# Patient Record
Sex: FEMALE | Race: WHITE | NOT HISPANIC OR LATINO | Employment: PART TIME | URBAN - METROPOLITAN AREA
[De-identification: names, ages, dates, MRNs, and addresses within clinical notes are randomized per-mention and may not be internally consistent; named-entity substitution may affect disease eponyms.]

---

## 2018-01-18 NOTE — PROGRESS NOTES
Assessment    1  Never a smoker   2  Migraine headache (346 90) (G43 909)   3  Need for lipid screening (V77 91) (Z13 220)   4  Encounter for vitamin deficiency screening (V77 99) (Z13 21)   5  Encounter for preventive health examination (V70 0) (Z00 00)    Plan  Encounter for vitamin deficiency screening, Migraine headache, Need for lipid screening    · (1) VITAMIN D 25-HYDROXY; Status:Active - Retrospective Authorization; Requested  for:[id number];   FamHx: Heart Disease, FamHx: Hypertension, Migraine headache, Need for lipid  screening    · (1) LIPID PANEL, FASTING; Status:Canceled - Retrospective Authorization;   Migraine headache    · (1) CBC/PLT/DIFF; Status:Active - Retrospective By Protocol Authorization; Requested  for:[id number];    · (1) CBC/PLT/DIFF; Status:Canceled - Retrospective Authorization; Need for lipid screening    · (1) LIPID PANEL, FASTING; Status:Active - Retrospective By Protocol Authorization; Requested for:[id number];   Screening for tuberculosis    · Tubersol 5 UNIT/0 1ML Intradermal Solution     Check PPD  Fill out form  Discussion/Summary  Currently, she eats an adequate diet and has an adequate exercise regimen  Advice and education were given regarding nutrition, calcium supplements and vitamin D supplements  Healthy  History of Present Illness  HM, Adult Female: The patient is being seen for a health maintenance evaluation  General Health: The patient's health since the last visit is described as good  Lifestyle:  She consumes a diverse and healthy diet  (Eats fruits, vegetables  Dietary calcium not much)   She exercises regularly  (Goes to the gym at school regularly  )   She does not use tobacco  She consumes alcohol  caffeine not much  Reproductive health:  she is sexually active  Screening: Additional History:   Gyn checkup  HPI: Hal Nelsy says her headaches have been better  Thinks was related to her high school environment   Relieved by Excedrin Migraine and sleep  Active Problems    1  Breakthrough bleeding (626 6) (N92 1)   2  Classic migraine with aura (346 00) (G43 109)   3  Migraine headache (346 90) (G43 909)   4  Tension type headache (339 10) (G44 209)    Past Medical History    · History of headache (V13 89) (Z87 898)    Surgical History    · History of Oral Surgery   · History of Oral Surgery Tooth Extraction    Family History  Mother    · Family history of Hypertension (V17 49)  Grandmother    · Family history of arthritis (V17 7) (Z82 61)   · Family history of diabetes mellitus (V18 0) (Z83 3)  Maternal Grandmother    · Family history of Cancer   · Family history of Hypertension (V17 49)  Paternal Grandmother    · Family history of Diabetes Mellitus (V18 0)   · Family history of Heart Disease (V17 49)   · Family history of Hypertension (V17 49)  Grandfather    · Family history of arthritis (V17 7) (Z82 61)   · Family history of diabetes mellitus (V18 0) (Z83 3)  Maternal Grandfather    · Family history of Cancer   · Family history of Diabetes Mellitus (V18 0)   · Family history of Heart Disease (V17 49)   · Family history of Hypertension (V17 49)  Family History    · Family history of Family Health Status Siblings    Social History    · Denied: History of Alcohol Use (History)   · Daily Tea Consumption (2  Cups/Day)   · Never a smoker   · Never Used Drugs    Current Meds   1  Minastrin 24 Fe 1-20 MG-MCG(24) Oral Tablet Chewable Recorded    Allergies    1  No Known Drug Allergies    Vitals   Recorded: 16Yro3131 11:47AM   Heart Rate 68   Respiration 20   Systolic 487   Diastolic 76   Height 4 ft 10 in   Weight 111 lb 2 08 oz   BMI Calculated 23 23   BSA Calculated 1 41     Physical Exam    Constitutional   General appearance: No acute distress, well appearing and well nourished  Head and Face   Head and face: Normal     Eyes   Conjunctiva and lids: No swelling, erythema or discharge  Pupils and irises: Equal, round, reactive to light      Ears, Nose, Mouth, and Throat   Otoscopic examination: Tympanic membranes translucent with normal light reflex  Canals patent without erythema  Lips, teeth, and gums: Normal, good dentition  Oropharynx: Normal with no erythema, edema, exudate or lesions  Neck   Neck: Supple, symmetric, trachea midline, no masses  Thyroid: Normal, no thyromegaly  Pulmonary   Respiratory effort: No increased work of breathing or signs of respiratory distress  Auscultation of lungs: Clear to auscultation  Cardiovascular   Auscultation of heart: Normal rate and rhythm, normal S1 and S2, no murmurs  Abdominal aorta: Normal     Femoral pulses: 2+ bilaterally  Pedal pulses: 2+ bilaterally  Peripheral vascular exam: Normal     Examination of extremities for edema and/or varicosities: Normal     Abdomen   Abdomen: Non-tender, no masses  Liver and spleen: No hepatomegaly or splenomegaly  Lymphatic   Palpation of lymph nodes in neck: No lymphadenopathy  Palpation of lymph nodes in groin: No lymphadenopathy  Skin   Skin and subcutaneous tissue: Normal without rashes or lesions      Psychiatric   Mood and affect: Normal        Future Appointments    Date/Time Provider Specialty Site   07/15/2016 01:30 PM Josefina Chandler Nurse Schedule  [address]     Signatures   Electronically signed by : WALTER Enriquez ; Jul 13 2016  1:01PM EST                       (Author)

## 2018-05-15 ENCOUNTER — TELEPHONE (OUTPATIENT)
Dept: FAMILY MEDICINE CLINIC | Facility: MEDICAL CENTER | Age: 23
End: 2018-05-15

## 2018-05-15 DIAGNOSIS — Z86.69 HX OF MIGRAINES: ICD-10-CM

## 2018-05-15 DIAGNOSIS — E55.9 VITAMIN D DEFICIENCY: Primary | ICD-10-CM

## 2018-05-15 DIAGNOSIS — Z13.220 NEED FOR LIPID SCREENING: ICD-10-CM

## 2018-05-16 NOTE — TELEPHONE ENCOUNTER
Does not look like she never got the blood work ordered back in 2016  If not go ahead and give her the same orders

## 2018-06-13 ENCOUNTER — TELEPHONE (OUTPATIENT)
Dept: FAMILY MEDICINE CLINIC | Facility: MEDICAL CENTER | Age: 23
End: 2018-06-13

## 2018-06-13 ENCOUNTER — OFFICE VISIT (OUTPATIENT)
Dept: FAMILY MEDICINE CLINIC | Facility: MEDICAL CENTER | Age: 23
End: 2018-06-13
Payer: COMMERCIAL

## 2018-06-13 VITALS
BODY MASS INDEX: 21.03 KG/M2 | SYSTOLIC BLOOD PRESSURE: 130 MMHG | RESPIRATION RATE: 16 BRPM | HEART RATE: 100 BPM | WEIGHT: 100.6 LBS | DIASTOLIC BLOOD PRESSURE: 80 MMHG

## 2018-06-13 DIAGNOSIS — R73.9 ELEVATED BLOOD SUGAR LEVEL: Primary | ICD-10-CM

## 2018-06-13 DIAGNOSIS — E10.9 TYPE 1 DIABETES MELLITUS WITHOUT COMPLICATION (HCC): ICD-10-CM

## 2018-06-13 LAB — SL AMB POCT HEMOGLOBIN AIC: 13

## 2018-06-13 PROCEDURE — 99213 OFFICE O/P EST LOW 20 MIN: CPT | Performed by: FAMILY MEDICINE

## 2018-06-13 PROCEDURE — 83036 HEMOGLOBIN GLYCOSYLATED A1C: CPT | Performed by: FAMILY MEDICINE

## 2018-06-13 RX ORDER — NORETHINDRONE ACETATE AND ETHINYL ESTRADIOL AND FERROUS FUMARATE 1MG-20(24)
KIT ORAL
Refills: 4 | COMMUNITY
Start: 2018-05-17 | End: 2018-08-15 | Stop reason: SDUPTHER

## 2018-06-13 NOTE — TELEPHONE ENCOUNTER
I was able to speak with patient  Sugars have been high and she has only had a 10 lb weight loss  She otherwise feels fine  Please schedule her for tomorrow Friday  Please send this message forward to clinical so they may contact patient's mother  Please get permission from patient to speak with her mother

## 2018-06-13 NOTE — TELEPHONE ENCOUNTER
I spoke with Dorcas Woodard and she gave our office verbal approval to speak with her mother  Will have pt complete a communication consent form at her appt 6/14/18

## 2018-06-13 NOTE — TELEPHONE ENCOUNTER
Attempted to call the home phone and I did speak with patient's mother  Unfortunately there is no release to discuss medical information in the chart and I did not provide mom with any information  I did call patient on her cell phone but she did not answer  I did leave a message to call the office back  I did review the chart  Based on her sugars and her weight loss I am concerned that she has the start of diabetic ketoacidosis  I do recommend she be evaluated in the ER where they can get testing in a more timely manner  If needed they can also admit patient to get her sugars controlled and get an endocrinology consult

## 2018-06-13 NOTE — PROGRESS NOTES
Assessment/Plan:    No problem-specific Assessment & Plan notes found for this encounter  Diagnoses and all orders for this visit:    Elevated blood sugar level  -     POCT hemoglobin A1c    Type 1 diabetes mellitus without complication (HCC)  -     Ambulatory referral to Endocrinology; Future    Other orders  -     MELODETTA 24 FE 1-20 MG-MCG(24) CHEW; CHEW 1 TABLET BY ORAL ROUTE EVERY DAY      An A1c was performed in the office today and patient's A1c was 13%  By all accounts based on her age, body habitus, elevated glucose and elevated A1c she is a type 1 diabetic  I discussed with patient that she will need to see Endocrinology and she did agree  We contacted [facility] while patient was in the office and they were kind enough to have patient scheduled for tomorrow morning  A referral was placed  Follow-up with PCP on 6/25/2018 or sooner if needed  Subjective:      Patient ID: Joel Armendariz is a 25 y o  female  Patient presents for follow-up  She had labs done recently for primary care and looked on the lab web site and noticed that her sugars were high  She has had a 10 lb weight loss  She is not sure when this happened  She went on the scale one day in March and noticed that she lost 10 lb  Friends and family have commented about patient's weight loss  Patient otherwise feels fine  She has no complaints  The following portions of the patient's history were reviewed and updated as appropriate: allergies, current medications and problem list     Review of Systems   Constitutional: Negative for fever  Respiratory: Negative for shortness of breath  Cardiovascular: Negative for chest pain  Objective:      /80 (Cuff Size: Standard)   Pulse 100   Resp 16   Wt 45 6 kg (100 lb 9 6 oz)   BMI 21 03 kg/m²          Physical Exam   Constitutional: She appears well-developed and well-nourished     Cardiovascular: Normal rate, regular rhythm and normal heart sounds      Pulmonary/Chest: Effort normal and breath sounds normal

## 2018-06-13 NOTE — TELEPHONE ENCOUNTER
Mom called, said pt glucose was 254  Pt has appt with Dr Hermelindo Argueta 6/28/18  Mom is concerned wants to know if she should have further testing, be seen sooner, Please call her

## 2018-06-14 ENCOUNTER — OFFICE VISIT (OUTPATIENT)
Dept: ENDOCRINOLOGY | Facility: CLINIC | Age: 23
End: 2018-06-14
Payer: COMMERCIAL

## 2018-06-14 ENCOUNTER — OFFICE VISIT (OUTPATIENT)
Dept: DIABETES SERVICES | Facility: CLINIC | Age: 23
End: 2018-06-14
Payer: COMMERCIAL

## 2018-06-14 VITALS
HEART RATE: 98 BPM | WEIGHT: 100.4 LBS | DIASTOLIC BLOOD PRESSURE: 70 MMHG | BODY MASS INDEX: 21.07 KG/M2 | HEIGHT: 58 IN | SYSTOLIC BLOOD PRESSURE: 130 MMHG

## 2018-06-14 DIAGNOSIS — E10.9 TYPE 1 DIABETES MELLITUS WITHOUT COMPLICATION (HCC): Primary | ICD-10-CM

## 2018-06-14 DIAGNOSIS — E10.9 TYPE 1 DIABETES MELLITUS WITHOUT COMPLICATION (HCC): ICD-10-CM

## 2018-06-14 LAB
T4 FREE SERPL-MCNC: 1.13 NG/DL (ref 0.76–1.46)
TSH SERPL DL<=0.05 MIU/L-ACNC: 0.74 UIU/ML (ref 0.36–3.74)

## 2018-06-14 PROCEDURE — 99205 OFFICE O/P NEW HI 60 MIN: CPT | Performed by: INTERNAL MEDICINE

## 2018-06-14 PROCEDURE — 86341 ISLET CELL ANTIBODY: CPT | Performed by: INTERNAL MEDICINE

## 2018-06-14 PROCEDURE — 86255 FLUORESCENT ANTIBODY SCREEN: CPT | Performed by: INTERNAL MEDICINE

## 2018-06-14 PROCEDURE — 84439 ASSAY OF FREE THYROXINE: CPT | Performed by: INTERNAL MEDICINE

## 2018-06-14 PROCEDURE — 82784 ASSAY IGA/IGD/IGG/IGM EACH: CPT | Performed by: INTERNAL MEDICINE

## 2018-06-14 PROCEDURE — 86337 INSULIN ANTIBODIES: CPT | Performed by: INTERNAL MEDICINE

## 2018-06-14 PROCEDURE — 84443 ASSAY THYROID STIM HORMONE: CPT | Performed by: INTERNAL MEDICINE

## 2018-06-14 PROCEDURE — 83516 IMMUNOASSAY NONANTIBODY: CPT | Performed by: INTERNAL MEDICINE

## 2018-06-14 PROCEDURE — 83519 RIA NONANTIBODY: CPT | Performed by: INTERNAL MEDICINE

## 2018-06-14 PROCEDURE — G0108 DIAB MANAGE TRN  PER INDIV: HCPCS | Performed by: DIETITIAN, REGISTERED

## 2018-06-14 PROCEDURE — 36415 COLL VENOUS BLD VENIPUNCTURE: CPT | Performed by: INTERNAL MEDICINE

## 2018-06-14 RX ORDER — BLOOD-GLUCOSE METER
EACH MISCELLANEOUS 4 TIMES DAILY
Qty: 1 KIT | Refills: 0 | Status: SHIPPED | OUTPATIENT
Start: 2018-06-14 | End: 2018-12-21

## 2018-06-14 RX ORDER — LANCETS
EACH MISCELLANEOUS 4 TIMES DAILY
Qty: 120 EACH | Refills: 3 | Status: SHIPPED | OUTPATIENT
Start: 2018-06-14 | End: 2018-06-14 | Stop reason: CLARIF

## 2018-06-14 NOTE — TELEPHONE ENCOUNTER
Was advised by [facility] that Accu Chek guide and Beatriz Reasons  are not covered  Called [facility] [phone] was advised that lantus and Onetouch are covered  Submitted new prescriptions

## 2018-06-14 NOTE — Clinical Note
Education completed - for your review  BG in office was 217  Instructed her to send in BG log to Dr Del Cid Doctor in one week

## 2018-06-14 NOTE — PROGRESS NOTES
Gen Elise 25 y o  female MRN: [id number]    Encounter: [id number]      Assessment/Plan     Assessment: This is a 25y o -year-old female with new onset type 1 diabetes  Plan:  1  New onset type 1 diabetes that is poorly controlled based on hemoglobin A1c of 13%  I referred her to diabetes self-management training and medical nutrition therapy  She is to start [place] 20 units at bedtime and Fiasp 6 units with meals  Today, I ordered antibody testing, TSH, free T4 and celiac panel  I spent over 50 min and over 50% of it was spent counseling on pathophysiology of diabetes, nutrition, insulin and the goal standard treatment which is insulin pump and a glucose sensor  CC: Diabetes    History of Present Illness     HPI:  28-year-old woman presents for evaluation of type 1 diabetes  She states that she started losing weight about four months ago  In that time frame, she has lost about 10 lb  She denies any polyuria or polydipsia  She has not had any yeast infection but does occasionally have blurry vision  Her 1st cousin has type 1 diabetes  There is family history of type 2 diabetes  She had hemoglobin A1c done yesterday that was greater than 13%  Review of Systems   Constitutional: Negative for chills and fever  Eyes: Positive for visual disturbance  Respiratory: Negative for shortness of breath  Cardiovascular: Negative for chest pain  Gastrointestinal: Negative for constipation, diarrhea, nausea and vomiting  Endocrine: Negative for polydipsia and polyuria  Neurological: Negative for numbness  All other systems reviewed and are negative  Historical Information   No past medical history on file    Past Surgical History:   Procedure Laterality Date    MOUTH SURGERY  2012    TOOTH EXTRACTION       Social History   History   Alcohol Use No     History   Drug Use No     History   Smoking Status    Never Smoker   Smokeless Tobacco    Not on file     Family History: Family History   Problem Relation Age of Onset    Hypertension Mother     Cancer Maternal Grandmother     Hypertension Maternal Grandmother     Cancer Maternal Grandfather     Diabetes Maternal Grandfather     Heart disease Maternal Grandfather     Hypertension Maternal Grandfather     Diabetes Paternal Grandmother     Heart disease Paternal Grandmother     Hypertension Paternal Grandmother     Arthritis Other     Diabetes Other     Arthritis Other     Diabetes Other        Meds/Allergies   Current Outpatient Prescriptions   Medication Sig Dispense Refill    MELODETTA 24 FE 1-20 MG-MCG(24) CHEW CHEW 1 TABLET BY ORAL ROUTE EVERY DAY  4     No current facility-administered medications for this visit  No Known Allergies    Objective   Vitals: Blood pressure 130/70, pulse 98, height 4' 10" (1 473 m), weight 45 5 kg (100 lb 6 4 oz)  Physical Exam   Constitutional: She is oriented to person, place, and time  She appears well-developed and well-nourished  No distress  HENT:   Head: Normocephalic and atraumatic  Mouth/Throat: Oropharynx is clear and moist and mucous membranes are normal  No oropharyngeal exudate  Eyes: Conjunctivae, EOM and lids are normal  Right eye exhibits no discharge  Left eye exhibits no discharge  No scleral icterus  Neck: Neck supple  No thyromegaly present  Cardiovascular: Normal rate, regular rhythm and normal heart sounds  Exam reveals no gallop and no friction rub  Pulses are no weak pulses  No murmur heard  Pulses:       Dorsalis pedis pulses are 1+ on the right side, and 1+ on the left side  Pulmonary/Chest: Effort normal and breath sounds normal  No respiratory distress  She has no wheezes  Abdominal: Soft  Bowel sounds are normal  She exhibits no distension  There is no tenderness  Musculoskeletal: Normal range of motion  She exhibits no edema, tenderness or deformity     Feet:   Right Foot:   Skin Integrity: Negative for ulcer, skin breakdown, erythema, warmth, callus or dry skin  Lymphadenopathy:        Head (right side): No occipital adenopathy present  Head (left side): No occipital adenopathy present  Right: No supraclavicular adenopathy present  Left: No supraclavicular adenopathy present  Neurological: She is alert and oriented to person, place, and time  No cranial nerve deficit  Skin: Skin is warm and intact  No rash noted  She is not diaphoretic  No erythema  Psychiatric: She has a normal mood and affect  Her behavior is normal    Vitals reviewed  Patient's shoes and socks removed  Right Foot/Ankle   Right Foot Inspection  Skin Exam: skin normal and skin intact no dry skin, no warmth, no callus, no erythema, no maceration, no abnormal color, no pre-ulcer, no ulcer and no callus                            Sensory   Vibration: intact    Monofilament testing: intact  Vascular    The right DP pulse is 1+  Left Foot/Ankle  Left Foot Inspection                                           Sensory   Vibration: intact    Monofilament: intact  Vascular    The left DP pulse is 1+  Assign Risk Category:  No deformity present; No loss of protective sensation; No weak pulses       Risk: 0    The history was obtained from the review of the chart, patient and family  Lab Results:   Lab Results   Component Value Date/Time     HGB A1C 13 0 06/13/2018 03:43 PM       Imaging Studies: I have personally reviewed pertinent reports  Portions of the record may have been created with voice recognition software  Occasional wrong word or "sound a like" substitutions may have occurred due to the inherent limitations of voice recognition software  Read the chart carefully and recognize, using context, where substitutions have occurred

## 2018-06-14 NOTE — LETTER
June 14, 2018     Darryle Morales, DO  [address]  [facility] [address]    Patient: Celia Kitchen   YOB: 1995   Date of Visit: 6/14/2018       Dear Dr Bonny Bautista: Thank you for referring Crista Still to me for evaluation  Below are my notes for this consultation  If you have questions, please do not hesitate to call me  I look forward to following your patient along with you  Sincerely,        Lola Dan MD        CC: No Recipients  Lola Dan MD  6/14/2018  1:08 PM  Sign at close encounter   Celia Kitchen 25 y o  female MRN: [id number]    Encounter: [id number]      Assessment/Plan     Assessment: This is a 25y o -year-old female with new onset type 1 diabetes  Plan:  1  New onset type 1 diabetes that is poorly controlled based on hemoglobin A1c of 13%  I referred her to diabetes self-management training and medical nutrition therapy  She is to start [place] 20 units at bedtime and Fiasp 6 units with meals  Today, I ordered antibody testing, TSH, free T4 and celiac panel  I spent over 50 min and over 50% of it was spent counseling on pathophysiology of diabetes, nutrition, insulin and the goal standard treatment which is insulin pump and a glucose sensor  CC: Diabetes    History of Present Illness     HPI:  80-year-old woman presents for evaluation of type 1 diabetes  She states that she started losing weight about four months ago  In that time frame, she has lost about 10 lb  She denies any polyuria or polydipsia  She has not had any yeast infection but does occasionally have blurry vision  Her 1st cousin has type 1 diabetes  There is family history of type 2 diabetes  She had hemoglobin A1c done yesterday that was greater than 13%  Review of Systems    Historical Information   No past medical history on file    Past Surgical History:   Procedure Laterality Date    MOUTH SURGERY  2012    TOOTH EXTRACTION       Social History   History Alcohol Use No     History   Drug Use No     History   Smoking Status    Never Smoker   Smokeless Tobacco    Not on file     Family History:   Family History   Problem Relation Age of Onset    Hypertension Mother     Cancer Maternal Grandmother     Hypertension Maternal Grandmother     Cancer Maternal Grandfather     Diabetes Maternal Grandfather     Heart disease Maternal Grandfather     Hypertension Maternal Grandfather     Diabetes Paternal Grandmother     Heart disease Paternal Grandmother     Hypertension Paternal Grandmother     Arthritis Other     Diabetes Other     Arthritis Other     Diabetes Other        Meds/Allergies   Current Outpatient Prescriptions   Medication Sig Dispense Refill    MELODETTA 24 FE 1-20 MG-MCG(24) CHEW CHEW 1 TABLET BY ORAL ROUTE EVERY DAY  4     No current facility-administered medications for this visit  No Known Allergies    Objective   Vitals: Blood pressure 130/70, pulse 98, height 4' 10" (1 473 m), weight 45 5 kg (100 lb 6 4 oz)  Physical Exam    The history was obtained from the review of the chart, patient and family  Lab Results:   Lab Results   Component Value Date/Time     HGB A1C 13 0 06/13/2018 03:43 PM       Imaging Studies: I have personally reviewed pertinent reports  Portions of the record may have been created with voice recognition software  Occasional wrong word or "sound a like" substitutions may have occurred due to the inherent limitations of voice recognition software  Read the chart carefully and recognize, using context, where substitutions have occurred

## 2018-06-14 NOTE — PATIENT INSTRUCTIONS
1  Follow instructions for insulin: 6 units Fiasp with meals and 20 units Tresiba at bedtime  2  If insurance requires different insulins, call office with details  3  Report BG readings to Dr Debra Orellana in one week or call sooner if BG persists over 250 or symptoms of hypoglycemia without explanation

## 2018-06-14 NOTE — PROGRESS NOTES
Type 1 diabetes and Insulin Instruction  Met with Kelley Smithg and her parents for diabetes basics and initial insulin start education  Sindi James is currently taking the following diabetes medications: None, newly diagnosed type 1  Prescribed Insulin: Theadore Vona U100 20 units at bedtime, Fiasp 6 units at meals    Patient instructed on: Basic pathophysiology of type 1 diabetes, insulin types; timing of insulin; site selection and rotation; proper technique of injection and insulin administration, safe needle disposal; medication storage; side effects and precautions of insulin  Comments: Sindi James has good understanding of insulin usage and demonstrated use of injection technique in the office  She dosed her Theadore Vona in the office, 20 units at 2:50 pm  She will give her next dose tomorrow evening and every evening thereafter  She will start using Fiasp this afternoon when they go out for a meal for lunch  Patient instruction completed on Hypoglycemia: definition/risk, causes/symptoms, treatment, prevention of hypoglycemia, when to notify physician and EMS  Comments: Sindi James has good understanding of hypoglycemia and it's treatment  Patient instruction completed on Hyperglycemia: definition, causes/symptoms, treatment, prevention of hypoglycemia, when to notify physician and EMS  Comments: Sindi James has good understanding of hyperglycemia and treatment  Diabetes Education Record: Sindi James was given the following education material: Fast 15 List, Hypoglycemia Fact Sheet, Hyperglycemia Fact Sheet, NovoNordisk brochures for [facility]  Monitoring Blood Sugars    Instructions for Meter Teaching- Patient instructed in the following:  Site selection and skin preparation, Loading strips and lancet device, meter activation, control solution, obtaining blood sample, test strip and lancet disposal and recording log book entries   Patient has good understanding of material covered and was able to test their own blood sugar in office today  Comments: Instructed on AccuChek Guide meter, Patient demonstrated use, blood sugar in office 217 mg/dl  at  2:40pm  She last ate at 9am     Testing frequency:  Test sugars before meals and at bedtime  Send in BG log to Dr Jason Enamorado in one week  Goal Blood Sugars:   Premeal , even better <110  2hr after a meal <180, even better <140  A1C <7%, even better <6 5%  Patient response to instruction  Comprehension: very good  Motivation: very good  Expected Compliance: excellent  Readiness: action  Barriers to Learning: none known     Begin Time: 1:20  End Time: 3:20  Referring Provider: Jason Enamorado    Thank you for referring your patient to [facility], it was a pleasure working with them today  Please feel free to call with any questions or concerns      Mitra Clancy  [address] [facility],  [address] [facility] [address]

## 2018-06-15 DIAGNOSIS — E10.9 TYPE 1 DIABETES MELLITUS WITHOUT COMPLICATION (HCC): Primary | ICD-10-CM

## 2018-06-15 RX ORDER — INSULIN LISPRO 100 [IU]/ML
INJECTION, SOLUTION INTRAVENOUS; SUBCUTANEOUS
Qty: 5 PEN | Refills: 3 | Status: SHIPPED | OUTPATIENT
Start: 2018-06-15 | End: 2018-06-20 | Stop reason: SDUPTHER

## 2018-06-16 LAB
ENDOMYSIUM IGA SER QL: NEGATIVE
GLIADIN PEPTIDE IGA SER-ACNC: 5 UNITS (ref 0–19)
GLIADIN PEPTIDE IGG SER-ACNC: 3 UNITS (ref 0–19)
IGA SERPL-MCNC: 437 MG/DL (ref 87–352)
TTG IGA SER-ACNC: <2 U/ML (ref 0–3)
TTG IGG SER-ACNC: 4 U/ML (ref 0–5)

## 2018-06-18 LAB — GAD65 AB SER-ACNC: 24.2 U/ML (ref 0–5)

## 2018-06-19 LAB — ISLET CELL512 AB SER-ACNC: 24 U/ML

## 2018-06-20 ENCOUNTER — OFFICE VISIT (OUTPATIENT)
Dept: DIABETES SERVICES | Facility: CLINIC | Age: 23
End: 2018-06-20
Payer: COMMERCIAL

## 2018-06-20 ENCOUNTER — TELEPHONE (OUTPATIENT)
Dept: DIABETES SERVICES | Facility: CLINIC | Age: 23
End: 2018-06-20

## 2018-06-20 ENCOUNTER — TELEPHONE (OUTPATIENT)
Dept: ENDOCRINOLOGY | Facility: CLINIC | Age: 23
End: 2018-06-20

## 2018-06-20 DIAGNOSIS — E10.9 TYPE 1 DIABETES MELLITUS WITHOUT COMPLICATION (HCC): Primary | ICD-10-CM

## 2018-06-20 PROCEDURE — G0108 DIAB MANAGE TRN  PER INDIV: HCPCS | Performed by: DIETITIAN, REGISTERED

## 2018-06-20 RX ORDER — INSULIN LISPRO 100 [IU]/ML
INJECTION, SOLUTION INTRAVENOUS; SUBCUTANEOUS
Qty: 5 PEN | Refills: 0
Start: 2018-06-20 | End: 2018-09-13

## 2018-06-20 NOTE — PROGRESS NOTES
Carbohydrate Counting Instruction    Met with Maty Jung for carbohydrate counting  Maty Jung is currently on the following insulin regimen:  Fiasp 6 units with meals, Tresiba 20 units at bedtime    Patient Instructed on: Carbohydrate counting  Present at visit: Maty Jung and her mom  Maty Jung brought in her BG records and a one day food diary  Discussed her episodes of low blood sugar and why they occurred  Discussed treatment of hypoglycemia  Reviewed BG readings and noted that FBG dropping by 6-10 points daily since the weekend  Used Power Point, measuring cups, food labels, and other props to teach carb counting  Provided the portion book and showed her my Calorie Meldon Lack book and reviewed how to use  Maty Jung completed the food diary exercise without difficulty  Reminded her that vegetables affect the BG level very slowly so those carbs are not counted and do not need rapid acting insulin  Provided a 3 day food, insulin, BG record for her to complete  She will bring it in for RN CDE visit next Monday  She is very interested in flexible insulin dosing and using an insulin pump  Directed some of her questions to be asked on Monday  Upon review, will determine flexible insulin regimen and schedule for further teaching  Diabetes Education Record  Maty Jung received the following handouts: Portion Booklet, Food diary      Patient response to instruction    Comprehensionvery good  Motivationexcellent  Expected Complianceexcellent  Response to Teachback: 100%, demonstrated understanding    Begin Time: 2:15  End Time: 3:30  Referring Provider: Katherine Cottrellding you for referring your patient to [facility], it was a pleasure working with them today  Please feel free to call with any questions or concerns      Maisha Flannery  [address] [facility],  [address] [facility] [phone]

## 2018-06-20 NOTE — PATIENT INSTRUCTIONS
1  Keep 3 day food, BG, and insulin diary  2  Send record in via email or fax  3  Contact endocrinology if am fasting BG reading continues to drop

## 2018-06-20 NOTE — TELEPHONE ENCOUNTER
Called patient to give new insulin orders per Chayo Vazquez PA-C based on [facility] records indicating downward trend in FBG daily  Reduce Tresiba to 16 units at bedtime, Reduce Fiasp to 5 units before meals  Send in a BG log in one week or ASAP if lows less than 80

## 2018-06-20 NOTE — TELEPHONE ENCOUNTER
Blood sugars trending lower each day she brought to appt with gordo for education  intially readings in 200s, now often in the 80s  For now reduce tresiba to 16 units and reduce fiasp to 5 units before meals  Send BG log in 1 week or ASAP if lows  Steven Bhagat will call patient

## 2018-06-22 ENCOUNTER — TELEPHONE (OUTPATIENT)
Dept: ENDOCRINOLOGY | Facility: CLINIC | Age: 23
End: 2018-06-22

## 2018-06-22 NOTE — TELEPHONE ENCOUNTER
Patient called and wanted to let you know that her reading this morning was 70 and wanted to know what to do   She received a call yesterday that if readings were low to call the office [place] was reduce to 16units and Fiasp  5unit before meals

## 2018-06-24 LAB — INSULIN AB SER-ACNC: <5 UU/ML

## 2018-06-25 ENCOUNTER — TELEPHONE (OUTPATIENT)
Dept: DIABETES SERVICES | Facility: CLINIC | Age: 23
End: 2018-06-25

## 2018-06-25 ENCOUNTER — TELEPHONE (OUTPATIENT)
Dept: ENDOCRINOLOGY | Facility: CLINIC | Age: 23
End: 2018-06-25

## 2018-06-25 ENCOUNTER — OFFICE VISIT (OUTPATIENT)
Dept: DIABETES SERVICES | Facility: CLINIC | Age: 23
End: 2018-06-25
Payer: COMMERCIAL

## 2018-06-25 ENCOUNTER — OFFICE VISIT (OUTPATIENT)
Dept: FAMILY MEDICINE CLINIC | Facility: MEDICAL CENTER | Age: 23
End: 2018-06-25
Payer: COMMERCIAL

## 2018-06-25 VITALS
SYSTOLIC BLOOD PRESSURE: 112 MMHG | BODY MASS INDEX: 22.07 KG/M2 | DIASTOLIC BLOOD PRESSURE: 78 MMHG | WEIGHT: 105.6 LBS | HEART RATE: 78 BPM | OXYGEN SATURATION: 98 %

## 2018-06-25 DIAGNOSIS — E10.9 TYPE 1 DIABETES MELLITUS WITHOUT COMPLICATION (HCC): Primary | ICD-10-CM

## 2018-06-25 DIAGNOSIS — Z00.00 ROUTINE ADULT HEALTH MAINTENANCE: ICD-10-CM

## 2018-06-25 DIAGNOSIS — G43.109 MIGRAINE WITH AURA AND WITHOUT STATUS MIGRAINOSUS, NOT INTRACTABLE: ICD-10-CM

## 2018-06-25 PROCEDURE — 99395 PREV VISIT EST AGE 18-39: CPT | Performed by: FAMILY MEDICINE

## 2018-06-25 PROCEDURE — G0108 DIAB MANAGE TRN  PER INDIV: HCPCS

## 2018-06-25 NOTE — PROGRESS NOTES
Tim Knott was recently diagnosed with Type 1 diabetes  Her mother had noticed weight loss in the last several months  Patient had noticed a little bit of increased thirst and nocturia  She had routine blood work ordered by our office for this appointment  Glucose fasting was 254  She was seen by Dr Ilya Michael and then referred to  endocrinology  She is currently on insulin, attending diabetic classes, and monitor her blood sugar and following a diabetic diet  Karmen HAILE Caffeine drinks coffee but was using added sugar  Alcohol  She has been exercising regularly but not recently  FH: posiitive for CAD, diabetes, stroke    She has  a history of migraine headaches  She would get vomiting and auras  Gets occ  paresthesia with tingling n her fingertips  Does not appear to be menstrual related  They usually respond to Excedrin migraine or to Motrin  She says they have gotten better for while but had recurred recently since she has been diagnosed with a new onset diabetes  She is sexually active and uses oral contraceptives  She sees gyn annually  O: /78 (BP Location: Left arm, Patient Position: Sitting, Cuff Size: Adult)   Pulse 78   Wt 47 9 kg (105 lb 9 6 oz)   SpO2 98%   BMI 22 07 kg/m²   Her weight is up 5 lb in the last several weeks  Physical Exam   Constitutional: She is oriented to person, place, and time  She appears well-developed and well-nourished  HENT:   Head: Normocephalic  Right Ear: External ear normal    Left Ear: External ear normal    Nose: Nose normal    Mouth/Throat: Oropharynx is clear and moist    Eyes: Conjunctivae and EOM are normal  Pupils are equal, round, and reactive to light  No scleral icterus  Neck: Normal range of motion  Neck supple  Carotid bruit is not present  No thyroid mass and no thyromegaly present  Cardiovascular: Normal rate, regular rhythm, normal heart sounds and intact distal pulses      Pulses:       Femoral pulses are 2+ on the right side, and 2+ on the left side  Popliteal pulses are 2+ on the right side, and 2+ on the left side  Dorsalis pedis pulses are 2+ on the right side, and 2+ on the left side  Posterior tibial pulses are 2+ on the right side, and 2+ on the left side  Pulmonary/Chest: Effort normal and breath sounds normal  No respiratory distress  She has no wheezes  She has no rales  Abdominal: Soft  Normal aorta and bowel sounds are normal  She exhibits no distension and no mass  There is no hepatosplenomegaly  There is no tenderness  Musculoskeletal: Normal range of motion  She exhibits no edema  Lymphadenopathy:        Head (right side): No submandibular and no occipital adenopathy present  Head (left side): No submandibular and no occipital adenopathy present  She has no cervical adenopathy  Right: No inguinal and no supraclavicular adenopathy present  Left: No inguinal and no supraclavicular adenopathy present  Neurological: She is oriented to person, place, and time  Skin: No lesion and no rash noted  Psychiatric: She has a normal mood and affect  Her behavior is normal       BW 5/23/18: LP Chol 201 Vit D 20    Assessment   1  New onset type 1 diabetes-per Endocrinology  She seems to be adjusting reasonably well to the diagnosis  2   Migraine headaches-recent increase may be related to her recent diagnosis  I have asked her to keep a headache diary to help assess need for preventive treatment  In the meantime the she will use ibuprofen or Excedrin migraine as needed  Consider OC effect  3   Vitamin-D deficiency-she will continue the recommended 2000 units daily  Plan  As above  Recheck 6 months    The

## 2018-06-25 NOTE — TELEPHONE ENCOUNTER
Please prescribe a Glucagon Emergency Kit for Catarino  She and her mom received instruction today  Thanks

## 2018-06-25 NOTE — TELEPHONE ENCOUNTER
----- Message from Zulma Velazquez MD sent at 6/24/2018 10:38 PM EDT -----  Type 1 diabetes is confirmed  Normal thyroid testing  No evidence of celiac disease  Sent to my chart

## 2018-06-25 NOTE — PROGRESS NOTES
Type 1 diabetes is confirmed  Normal thyroid testing  No evidence of celiac disease  Sent to my chart

## 2018-06-25 NOTE — PROGRESS NOTES
Gretel Ortiz is here today with her mom for hypoglycemia review and Glucagon instruction  She will need a Glucagon Kit prescribed  She is also interested in learning more about CGM  The patient reports she has had 2 low blood sugar episodes thus far, each accompanied by profuse sweating, with meter readings of 54 and 65 respectively, treated successfully with 4 oz of juice  Topics discussed today included:  Symptoms treatment and prevention of hypoglycemia  Glucagon indications for use, preparation, administration and aftercare  When to call 911  Importance of wearing medical ID  I demonstrated a Glucagon kit and Kylee's mom gave a return demo using the practice kit  We discussed the usefulness of continuous glucose monitoring and I demonstrated the Dexcom G6  Gretel Ortiz is very interested in getting a CGM as soon as possible, and asked me to discuss this with Dr Claudia Mistry  I provided product information and a form for ordering the device  Mother will return this to me when they return to the office for RD follow-up for flexible insulin (to be scheduled)  Gretel Ortiz agreed to return for sick day management instruction, and sensor training with me when she receives her device

## 2018-06-25 NOTE — PATIENT INSTRUCTIONS
Glucagon Emergency Kit  Fill out & return Dexcom application  Return for sensor training when device received

## 2018-06-26 ENCOUNTER — TELEPHONE (OUTPATIENT)
Dept: ENDOCRINOLOGY | Facility: CLINIC | Age: 23
End: 2018-06-26

## 2018-06-26 DIAGNOSIS — E10.9 TYPE 1 DIABETES MELLITUS WITHOUT COMPLICATION (HCC): Primary | ICD-10-CM

## 2018-06-28 ENCOUNTER — TELEPHONE (OUTPATIENT)
Dept: DIABETES SERVICES | Facility: CLINIC | Age: 23
End: 2018-06-28

## 2018-06-28 NOTE — TELEPHONE ENCOUNTER
During my visit with the patient and her mom on Monday, they both expressed interest in acquiring a personal Dexcom for Catarino  I provided information and an application form

## 2018-07-03 ENCOUNTER — OFFICE VISIT (OUTPATIENT)
Dept: DIABETES SERVICES | Facility: CLINIC | Age: 23
End: 2018-07-03
Payer: COMMERCIAL

## 2018-07-03 DIAGNOSIS — E10.9 TYPE 1 DIABETES MELLITUS WITHOUT COMPLICATION (HCC): Primary | ICD-10-CM

## 2018-07-03 LAB
LEFT EYE DIABETIC RETINOPATHY: NORMAL
RIGHT EYE DIABETIC RETINOPATHY: NORMAL
SEVERITY (EYE EXAM): NORMAL

## 2018-07-03 PROCEDURE — G0108 DIAB MANAGE TRN  PER INDIV: HCPCS | Performed by: DIETITIAN, REGISTERED

## 2018-07-03 NOTE — PATIENT INSTRUCTIONS
1  Insulin to carb ratio 1:15  2  Sensitivity factor 1:60 with target 100-120  3  3 day food, insulin, and BG record  4   BG record to Dr Becca Cabrera in 1 week

## 2018-07-03 NOTE — Clinical Note
Flexible insulin teaching completed  Interested in [address] and info given to contact [employer]

## 2018-07-03 NOTE — PROGRESS NOTES
Flexible Insulin Instruction    Met with Catarino for flexible insulin  Catarino is currently on the following insulin regimen: Fiasp 5 units with meals and Tresiba 13 units at bedtime    Patient Instructed on: Flexible Insulin  Present at visit: Catarino and her mom  Her 3 day food, insulin, and BG record was complete and easy to read  Order obtained last week for insulin ratios and target  ICR 1:15, ISF: 1:60 with target of 100-120  Used powerpoint slides and workbook to teach concepts  See scanned order sheet for details  Catarino will follow new insulin regimen starting at dinnertime tonight  She will send BG records to Dr Nati Boone in 1 week and 3 day food record to me by next week  She knows how to treat hypoglycemia and will call with any problems or emergencies  She is interested in obtaining a personal Dexcom G6 as soon as possible and was given Rosibel Da Silva's information  Application form collected  She will sign up for Intro to Pumping class  Diabetes Education Record  Catarino received the following handouts: Flexible insulin Workbook      Patient response to instruction    Comprehensionvery good  Motivationvery good  Expected Compliancevery good  Response to Teachback: 100%, demonstrated understanding    Begin Time: 1:00   End Time: 2:00  Referring Provider: Patrick myers for referring your patient to [address] [place] [address], it was a pleasure working with them today  Please feel free to call with any questions or concerns      Ramses Kimble  [address],  [address] [facility] [id number]

## 2018-07-16 DIAGNOSIS — E10.9 TYPE 1 DIABETES MELLITUS WITHOUT COMPLICATION (HCC): Primary | ICD-10-CM

## 2018-07-17 ENCOUNTER — OFFICE VISIT (OUTPATIENT)
Dept: DIABETES SERVICES | Facility: CLINIC | Age: 23
End: 2018-07-17
Payer: COMMERCIAL

## 2018-07-17 DIAGNOSIS — E10.9 TYPE 1 DIABETES MELLITUS WITHOUT COMPLICATION (HCC): Primary | ICD-10-CM

## 2018-07-17 PROCEDURE — G0108 DIAB MANAGE TRN  PER INDIV: HCPCS

## 2018-07-17 NOTE — PATIENT INSTRUCTIONS
Contact pump company of choice and notify endocrinology office of pump choice  Call to schedule pump skills class once you are notified that your pump is being shipped

## 2018-07-17 NOTE — PROGRESS NOTES
Akshat Phillips was diagnosed 1 month ago with type 1 diabetes  She is currently using  [place] and tresiba  She will be changing to Humalog and Lanutus  She is taking 12 units of Tresiba and using flexible insulin dosing  Currenly she is following 1:C - 1:15, ISF - 60, and a target of 100 - 120  She is testing as many as 8 times a day  She has started the process of obtaining a DexCom G6 sensor  She was accompanied with her mother for the Intro to pumping class  She is interested in either the tslim x2, or the Medtronic 670  The Tslim along with the DexCom  Will eliminate glucose testing which she is very interested decreasing  She was shown the Medtronic 670G and the features including the manual and auto mode feature  when used with the guardian sensor,  The tslim X2 and the Dexcom, software updates that are downloadable with this pump, and the [facility]   Basal bolus features and suspend for low feature on the Medtronic and the Tslim  She is interested in and ipump that has the sensor technology integrated into the pump  She will discuss with Dr Sera Erickson at her next visit   I gave her information on the pumps and contact information for the company representatives

## 2018-07-24 ENCOUNTER — CLINICAL SUPPORT (OUTPATIENT)
Dept: FAMILY MEDICINE CLINIC | Facility: MEDICAL CENTER | Age: 23
End: 2018-07-24
Payer: COMMERCIAL

## 2018-07-24 DIAGNOSIS — Z11.1 SCREENING FOR TUBERCULOSIS: Primary | ICD-10-CM

## 2018-07-24 PROCEDURE — 86580 TB INTRADERMAL TEST: CPT

## 2018-07-26 ENCOUNTER — OFFICE VISIT (OUTPATIENT)
Dept: ENDOCRINOLOGY | Facility: CLINIC | Age: 23
End: 2018-07-26
Payer: COMMERCIAL

## 2018-07-26 VITALS
DIASTOLIC BLOOD PRESSURE: 72 MMHG | HEART RATE: 75 BPM | WEIGHT: 109.3 LBS | SYSTOLIC BLOOD PRESSURE: 118 MMHG | BODY MASS INDEX: 22.84 KG/M2

## 2018-07-26 DIAGNOSIS — E10.9 TYPE 1 DIABETES MELLITUS WITHOUT COMPLICATION (HCC): Primary | ICD-10-CM

## 2018-07-26 LAB
INDURATION: 0 MM
TB SKIN TEST: NEGATIVE

## 2018-07-26 PROCEDURE — 99213 OFFICE O/P EST LOW 20 MIN: CPT | Performed by: INTERNAL MEDICINE

## 2018-07-26 NOTE — PATIENT INSTRUCTIONS
Type 1 Diabetes in Adults   AMBULATORY CARE:   Type 1 diabetes  is a disease that affects how your body makes insulin and uses glucose (sugar)  Normally, when the blood sugar level increases, the pancreas makes more insulin  Insulin helps move sugar out of the blood so it can be used for energy  Type 1 diabetes develops because the immune system destroys cells in the pancreas that make insulin  The pancreas cannot make enough insulin, so the blood sugar level continues to rise  A family history of type 1 diabetes may increase your risk for diabetes  Common signs and symptoms include the following:   · More thirst than usual     · Frequent urination     · Hunger most of the time     · Weight loss without trying     · Blurred vision  Call 911 if:   · You have chest pain or shortness of breath  Seek care immediately if:   · You have a low blood sugar level and it does not improve with treatment  · Your blood sugar level is above 240 mg/dL and does not come down after you take a dose of insulin  · You have ketones  · You have a fever  · You have nausea or are vomiting and cannot keep any food or liquid down  · You have blurred or double vision  · Your breath has a fruity, sweet smell, or your breathing is shallow  · You have symptoms of a low blood sugar level, such as trouble thinking, sweating, or a pounding heartbeat  Contact your healthcare provider if:   · Your blood sugar levels are higher than your target goals  · You often have low blood sugar levels  · Your skin is red, dry, warm, or swollen  · You have a wound that does not heal      · You have trouble coping with your illness or you feel anxious or depressed  · You have questions or concerns about your condition or care  Check your blood sugar level as directed: You will be taught how to check a small drop of blood with a glucose monitor  You will need to check your blood sugar level at least 3 times each day   Ask your healthcare provider when and how often to check during the day  If you check your blood sugar level before a meal , it should be between 80 and 130 mg/dL  If you check your blood sugar level 1 to 2 hours after a meal , it should be less than 180 mg/dL  Ask your healthcare provider if these are good goals for you  You may need to check for ketones in your urine or blood if your level is higher than directed  Write down your results and show them to your healthcare provider  Your provider may use the results to make changes to your medicine, food, or exercise schedules  If your blood sugar level is too low: Your blood sugar level is too low if it goes below 70 mg/dL  If the level is too low, eat or drink 15 grams of fast-acting carbohydrate  These are found naturally in fruits  Fast-acting carbohydrates will raise your blood sugar level quickly  Examples of 15 grams of fast-acting carbohydrate are 4 ounces (½ cup) of fruit juice or 4 ounces of regular soda  Other examples are 2 tablespoons of raisins or 3 to 4 glucose tablets  Check your blood sugar level 15 minutes later  If the level is still low (less than 100 mg/dL), eat another 15 grams of carbohydrate  When the level returns to 100 mg/dL, eat a snack or meal that contains carbohydrates  This will help prevent another drop in blood sugar  Always carefully follow your healthcare provider's instructions on how to treat low blood sugar levels  Medical alert identification:  Wear medical alert jewelry or carry a card that says you have diabetes  Ask your healthcare provider where to get these items  Take your insulin as directed: You will need insulin each day  Insulin can be injected or given through an insulin pump  Ask your healthcare provider which method is best for you  You or a family member will be taught how to give insulin injections if this is the best method for you  Your family member can give you the injections if you are not able  Take your insulin as directed  Too much insulin may cause your blood sugar level to go too low  You will be taught how to adjust each insulin dose you take with meals  Always check your blood sugar level before the meal  The dose will be based on your blood sugar level, carbohydrates in the meal, and activity after the meal    Check your feet each day for sores:  Wear shoes and socks that fit correctly  Ask your healthcare provider for more information about foot care  Follow your meal plan:  A dietitian will help you make a meal plan to keep your blood sugar level steady  Do not skip meals  Your blood sugar level may drop too low if you have taken diabetes medicine and do not eat  · Keep track of carbohydrates (sugar and starchy foods)  Your blood sugar level can get too high if you eat too many carbohydrates  Your dietitian will help you plan meals and snacks that have the right amount of carbohydrates  · Eat low-fat foods , such as skinless chicken and low-fat milk  · Eat less sodium (salt)  Limit high-sodium foods, such as soy sauce, potato chips, and soup  Do not add salt to food you cook  Limit your use of table salt  · Eat high-fiber foods , such as vegetables, whole-grain breads, and beans  · Limit alcohol  Alcohol affects your blood sugar level and can make it harder to manage your diabetes  Limit alcohol to 1 drink a day if you are a woman  Limit alcohol to 2 drinks a day if you are a man  A drink of alcohol is 12 ounces of beer, 5 ounces of wine, or 1½ ounces of liquor  Maintain a healthy weight:  Ask your healthcare provider how much you should weigh  A healthy weight can help you control your diabetes  Ask your provider to help you create a weight loss plan if you are overweight  Together you can set manageable weight loss goals  Exercise as directed:  Exercise can help keep your blood sugar level steady, decrease your risk of heart disease, and help you lose weight   Stretch before and after you exercise  Exercise for at least 150 minutes every week  Spread this amount of exercise over at least 3 days a week  Do not skip exercise more than 2 days in a row  Include muscle strengthening activities 2 to 3 days each week  Older adults should include balance training 2 to 3 times each week  Activities that help increase balance include yoga and catarina chi  Work with your healthcare provider to create an exercise plan  · Check your blood sugar level before and after exercise  Healthcare providers may tell you to change the amount of insulin you take or food you eat  If your blood sugar level is high, check your blood or urine for ketones before you exercise  Do not exercise if your blood sugar level is high and you have ketones  · If your blood sugar level is less than 100 mg/dL, have a carbohydrate snack before you exercise  Examples are 4 to 6 crackers, ½ banana, 8 ounces (1 cup) of milk, or 4 ounces (½ cup) of juice  Drink water or liquids that do not contain sugar before, during, and after exercise  Ask your dietitian or healthcare provider which liquids you should drink when you exercise  · Do not sit for longer than 30 minutes  If you cannot walk around, at least stand up  This will help you stay active and keep your blood circulating  Do not smoke:  Nicotine and other chemicals in cigarettes and cigars can cause lung damage and make it more difficult to manage your diabetes  Ask your healthcare provider for information if you currently smoke and need help to quit  Do not use e-cigarettes or smokeless tobacco in place of cigarettes or to help you quit  They still contain nicotine  Check your blood pressure as directed:  Ask your healthcare provider what your blood pressure should be  Most adults with diabetes and high blood pressure should have a systolic blood pressure (first number) less than 140  Your diastolic blood pressure (second number) should be less than 90     Ask about vaccines: You have a higher risk for serious illness if you get the flu, pneumonia, or hepatitis  Ask your healthcare provider if you should get a flu, pneumonia, or hepatitis B vaccine, and when to get the vaccine  Follow up with your healthcare provider as directed: You may need to return to have your A1c checked every 3 months  Your healthcare provider will tell you the A1c level that is right for you  Most people should have an A1c less than 7%  You will need to return at least 1 time each year to have your feet checked  You will need an eye exam 1 time each year to check for retinopathy  You will also need urine tests every year to check for kidney problems  Write down your questions so you remember to ask them during your visits  © 2017 [facility] Information is for End User's use only and may not be sold, redistributed or otherwise used for commercial purposes  All illustrations and images included in CareNotes® are the copyrighted property of A D A M [employer] Inc  or Yuval Huynh  The above information is an  only  It is not intended as medical advice for individual conditions or treatments  Talk to your doctor, nurse or pharmacist before following any medical regimen to see if it is safe and effective for you

## 2018-07-26 NOTE — PROGRESS NOTES
Kelley Saba 25 y o  female MRN: [id number]    Encounter: [id number]      Assessment/Plan     Assessment: This is a 25y o -year-old female with diabetes with hyperglycemia  Plan:  1  Type 1 diabetes with some hyperglycemia-continue current insulin therapy  We did discuss going on an insulin pump and sensor which she is agreeable to  I gave her information about the 670 G system by [employer]  They will contact the local representative to start the process  CC: Diabetes    History of Present Illness     HPI:  24-year-old woman with type 1 diabetes diagnosed six weeks ago  She is currently on basal bolus insulin therapy  She denies any hypoglycemia  Review of Systems   Constitutional: Negative for chills and fever  Respiratory: Negative for shortness of breath  Cardiovascular: Negative for chest pain  Gastrointestinal: Negative for constipation, diarrhea, nausea and vomiting  Endocrine: Negative for polydipsia and polyuria  All other systems reviewed and are negative  Historical Information   History reviewed  No pertinent past medical history    Past Surgical History:   Procedure Laterality Date    MOUTH SURGERY  2012    TOOTH EXTRACTION       Social History   History   Alcohol Use No     Comment: rarely     History   Drug Use No     History   Smoking Status    Never Smoker   Smokeless Tobacco    Never Used     Family History:   Family History   Problem Relation Age of Onset    Hypertension Mother     Cancer Maternal Grandmother     Hypertension Maternal Grandmother     Cancer Maternal Grandfather     Diabetes Maternal Grandfather     Heart disease Maternal Grandfather     Hypertension Maternal Grandfather     Diabetes Paternal Grandmother     Heart disease Paternal Grandmother     Hypertension Paternal Grandmother     Arthritis Other     Diabetes Other     Arthritis Other     Diabetes Other        Meds/Allergies   Current Outpatient Prescriptions   Medication Sig Dispense Refill    Blood Glucose Monitoring Suppl (Claudia Álvarez) w/Device KIT by Does not apply route 4 (four) times a day Use as directed 1 kit 0    glucagon (GLUCAGON EMERGENCY) 1 MG injection Inject 1 mg under the skin once as needed for low blood sugar for up to 1 dose 2 each 3    glucose blood (ONETOUCH VERIO) test strip Test twice daily as instructed 120 each 3    HUMALOG KWIKPEN 100 units/mL injection pen Inject 5 Units under the skin 3 (three) times a day with meals  (using fiasp until runs out) 5 pen 0    insulin glargine (LANTUS SOLOSTAR) 100 units/mL injection pen Inject 16  Units under the skin daily (using tresiba until runs out) (Patient taking differently: Inject 12 Units under the skin daily at bedtime Inject 16  Units under the skin daily (using tresiba until runs out) ) 5 pen 0    Insulin Pen Needle (BD PEN NEEDLE LV U/F) 32G X 4 MM MISC by Does not apply route 4 (four) times a day 120 each 3    MELODETTA 24 FE 1-20 MG-MCG(24) CHEW CHEW 1 TABLET BY ORAL ROUTE EVERY DAY  4     No current facility-administered medications for this visit  Allergies   Allergen Reactions    No Active Allergies        Objective   Vitals: Blood pressure 118/72, pulse 75, weight 49 6 kg (109 lb 4 8 oz)  Physical Exam   Constitutional: She is oriented to person, place, and time  She appears well-developed and well-nourished  No distress  HENT:   Head: Normocephalic and atraumatic  Mouth/Throat: Oropharynx is clear and moist and mucous membranes are normal  No oropharyngeal exudate  Eyes: Conjunctivae, EOM and lids are normal  Right eye exhibits no discharge  Left eye exhibits no discharge  No scleral icterus  Neck: Neck supple  No thyromegaly present  Cardiovascular: Normal rate, regular rhythm and normal heart sounds  Exam reveals no gallop and no friction rub  No murmur heard  Pulmonary/Chest: Effort normal and breath sounds normal  No respiratory distress  She has no wheezes  Abdominal: Soft  Bowel sounds are normal  She exhibits no distension  There is no tenderness  Musculoskeletal: Normal range of motion  She exhibits no edema, tenderness or deformity  Lymphadenopathy:        Head (right side): No occipital adenopathy present  Head (left side): No occipital adenopathy present  Right: No supraclavicular adenopathy present  Left: No supraclavicular adenopathy present  Neurological: She is alert and oriented to person, place, and time  No cranial nerve deficit  Skin: Skin is warm and intact  No rash noted  She is not diaphoretic  No erythema  Psychiatric: She has a normal mood and affect  Her behavior is normal    Vitals reviewed  The history was obtained from the review of the chart, patient  Lab Results:   Lab Results   Component Value Date/Time     HGB A1C 13 0 06/13/2018 03:43 PM           Imaging Studies: I have personally reviewed pertinent reports  Portions of the record may have been created with voice recognition software  Occasional wrong word or "sound a like" substitutions may have occurred due to the inherent limitations of voice recognition software  Read the chart carefully and recognize, using context, where substitutions have occurred

## 2018-08-03 ENCOUNTER — TELEPHONE (OUTPATIENT)
Dept: ENDOCRINOLOGY | Facility: CLINIC | Age: 23
End: 2018-08-03

## 2018-08-03 NOTE — TELEPHONE ENCOUNTER
Completed medical necessity form for patient to receive [employer] Med 670G pump and supplies   Faxed to [employer] [phone]

## 2018-08-15 ENCOUNTER — TELEPHONE (OUTPATIENT)
Dept: GYNECOLOGY | Facility: CLINIC | Age: 23
End: 2018-08-15

## 2018-08-15 DIAGNOSIS — Z30.41 SURVEILLANCE FOR BIRTH CONTROL, ORAL CONTRACEPTIVES: Primary | ICD-10-CM

## 2018-08-15 RX ORDER — NORETHINDRONE ACETATE AND ETHINYL ESTRADIOL AND FERROUS FUMARATE 1MG-20(24)
1 KIT ORAL DAILY
Qty: 28 TABLET | Refills: 3 | Status: SHIPPED | OUTPATIENT
Start: 2018-08-15 | End: 2018-09-24 | Stop reason: ALTCHOICE

## 2018-09-04 ENCOUNTER — TELEPHONE (OUTPATIENT)
Dept: ENDOCRINOLOGY | Facility: CLINIC | Age: 23
End: 2018-09-04

## 2018-09-04 NOTE — TELEPHONE ENCOUNTER
Received fax from [facility], patient has been approved for Pump   Effective dates 8/17/2018 through 11/17/2018  reference  # [id number]

## 2018-09-05 ENCOUNTER — TELEPHONE (OUTPATIENT)
Dept: FAMILY MEDICINE CLINIC | Facility: MEDICAL CENTER | Age: 23
End: 2018-09-05

## 2018-09-05 NOTE — TELEPHONE ENCOUNTER
School form in your bin for signature, filled out and ready to go  Please call when done so she can

## 2018-09-12 ENCOUNTER — OFFICE VISIT (OUTPATIENT)
Dept: DIABETES SERVICES | Facility: CLINIC | Age: 23
End: 2018-09-12
Payer: COMMERCIAL

## 2018-09-12 DIAGNOSIS — E10.9 TYPE 1 DIABETES MELLITUS WITHOUT COMPLICATION (HCC): Primary | ICD-10-CM

## 2018-09-12 PROCEDURE — G0108 DIAB MANAGE TRN  PER INDIV: HCPCS

## 2018-09-12 NOTE — PROGRESS NOTES
Gretel Ortiz was accompanied by her mother , she is here for pump skills class  She has chosen the Med[employer] 670G pump and guardian  Sensor  Gretel Ortiz is currently taking Lantus 12units at 10:30 pm and Humalog  She is doing flexible insulin dosing and her I:C is 1:15, ISF- 60 and target of 120  At this session we discused basal/bolus dosing vis pump, infusion sets, meal guidelines for pump start, sickdays, ketone testing and temp basal   Gretel Ortiz will be starting her pump on Oct 8  I have asked her to exchange one of the infusion set boxes to sure T  She completed the skills post test with a 100%  I have asked her to keep a record of her glucoses and insulin doses and to bring to her next appt  Remberto Kauffman

## 2018-09-12 NOTE — PATIENT INSTRUCTIONS
Contact pump supplier and change 1 box of infusion sets to sure T     Bring the following to next appointment glucose and insulin log      The following pump supplies - pump, meter, 2 resevoirs,

## 2018-09-13 DIAGNOSIS — E10.9 TYPE 1 DIABETES MELLITUS WITHOUT COMPLICATION (HCC): Primary | ICD-10-CM

## 2018-09-24 ENCOUNTER — OFFICE VISIT (OUTPATIENT)
Dept: OBGYN CLINIC | Facility: MEDICAL CENTER | Age: 23
End: 2018-09-24
Payer: COMMERCIAL

## 2018-09-24 VITALS
BODY MASS INDEX: 23.3 KG/M2 | WEIGHT: 111 LBS | HEIGHT: 58 IN | DIASTOLIC BLOOD PRESSURE: 80 MMHG | SYSTOLIC BLOOD PRESSURE: 120 MMHG

## 2018-09-24 DIAGNOSIS — Z30.41 ENCOUNTER FOR SURVEILLANCE OF CONTRACEPTIVE PILLS: ICD-10-CM

## 2018-09-24 DIAGNOSIS — Z01.419 ENCNTR FOR GYN EXAM (GENERAL) (ROUTINE) W/O ABN FINDINGS: Primary | ICD-10-CM

## 2018-09-24 DIAGNOSIS — G43.109 MIGRAINE WITH AURA AND WITHOUT STATUS MIGRAINOSUS, NOT INTRACTABLE: ICD-10-CM

## 2018-09-24 PROCEDURE — 99385 PREV VISIT NEW AGE 18-39: CPT | Performed by: OBSTETRICS & GYNECOLOGY

## 2018-09-24 RX ORDER — ACETAMINOPHEN AND CODEINE PHOSPHATE 120; 12 MG/5ML; MG/5ML
1 SOLUTION ORAL DAILY
Qty: 28 TABLET | Refills: 6 | Status: SHIPPED | OUTPATIENT
Start: 2018-09-24 | End: 2018-10-29 | Stop reason: ALTCHOICE

## 2018-09-24 NOTE — PROGRESS NOTES
Assessment/Plan:      Encntr for gyn exam (general) (routine) w/o abn findings  -     Cancel: Liquid-based pap, screening  -     Pap IG, Rfx HPV ASCU    Encounter for surveillance of contraceptive pills  -     norethindrone (MICRONOR) 0 35 MG tablet; Take 1 tablet (0 35 mg total) by mouth daily    Migraine with aura and without status migrainosus, not intractable      Not a candidate for combined OCP, transitioned today to progesterone only pills  Discussed importance of taking this at the same time every day  Discussed other options include LARCs as she does not plan on pregnancy for likely ~ 5 years  Would be good candidate for [facility]  Subjective:      Patient ID: Haris Hawkins is a 25 y o  female  Yearly- Declines STD testing  Menarche-12  Grav-0  LMP- 8/31/2018  Bc- Melodette 24 Fe  Pap- 2017-no records  Patient is a 20yo here for yearly exam      PMHx is significant for newly diagnosed type 1 DM, for which has been doing pretty well - she hopes to get a pump in October  She also has a history of migraines with aura, and she is on combined OCP  We discussed that migraines with aura is a contraindication for use of estrogen containing contraception, as it increases her stroke risk  She is sexually active, monogamous male partner, have only been with each other  No dyspareunia  No vaginal discharge, no pelvic pain  No urinary or bowel concerns  Periods have been regular and light on her OCP  Discussed STD testing, which patient declines today  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, teaching degree, working as   Gynecologic Exam   The patient's pertinent negatives include no pelvic pain or vaginal discharge  Pertinent negatives include no abdominal pain, constipation, diarrhea or dysuria         The following portions of the patient's history were reviewed and updated as appropriate: allergies, current medications, past family history, past medical history, past social history, past surgical history and problem list     Review of Systems   Gastrointestinal: Negative for abdominal distention, abdominal pain, constipation and diarrhea  Genitourinary: Negative for dyspareunia, dysuria, menstrual problem, pelvic pain, vaginal bleeding, vaginal discharge and vaginal pain  Objective:      /80 (BP Location: Left arm, Patient Position: Sitting, Cuff Size: Standard)   Ht 4' 10" (1 473 m)   Wt 50 3 kg (111 lb)   LMP 08/31/2018 (Exact Date)   BMI 23 20 kg/m²          Physical Exam   Constitutional: She is oriented to person, place, and time  She appears well-developed and well-nourished  No distress  Pulmonary/Chest: No respiratory distress  Right breast exhibits no inverted nipple, no mass, no nipple discharge, no skin change and no tenderness  Left breast exhibits no inverted nipple, no mass, no nipple discharge, no skin change and no tenderness  Abdominal: Soft  There is no tenderness  Genitourinary: Vagina normal and uterus normal  There is no rash or lesion on the right labia  There is no rash or lesion on the left labia  Uterus is not enlarged and not tender  Cervix exhibits discharge and friability  Cervix exhibits no motion tenderness  Right adnexum displays no mass and no tenderness  Left adnexum displays no mass and no tenderness  No vaginal discharge found  Musculoskeletal: She exhibits no edema  Neurological: She is alert and oriented to person, place, and time     Skin:

## 2018-09-27 LAB
CYTOLOGIST CVX/VAG CYTO: NORMAL
DX ICD CODE: NORMAL
OTHER STN SPEC: NORMAL
OTHER STN SPEC: NORMAL
PATH REPORT.FINAL DX SPEC: NORMAL
SL AMB NOTE:: NORMAL
SL AMB SPECIMEN ADEQUACY: NORMAL
SL AMB TEST METHODOLOGY: NORMAL

## 2018-10-04 DIAGNOSIS — E10.9 TYPE 1 DIABETES MELLITUS WITHOUT COMPLICATION (HCC): ICD-10-CM

## 2018-10-04 RX ORDER — PEN NEEDLE, DIABETIC 32GX 5/32"
NEEDLE, DISPOSABLE MISCELLANEOUS
Qty: 400 EACH | Refills: 2 | Status: SHIPPED | OUTPATIENT
Start: 2018-10-04 | End: 2018-10-15 | Stop reason: SDUPTHER

## 2018-10-08 ENCOUNTER — OFFICE VISIT (OUTPATIENT)
Dept: DIABETES SERVICES | Facility: CLINIC | Age: 23
End: 2018-10-08

## 2018-10-08 DIAGNOSIS — E10.9 TYPE 1 DIABETES MELLITUS WITHOUT COMPLICATION (HCC): Primary | ICD-10-CM

## 2018-10-08 PROCEDURE — MBPSH

## 2018-10-08 NOTE — PROGRESS NOTES
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  She is currently taking Lantus 12 units at bedtime, I:C ratio of 1:15, ISF - 60 and a target of 120  She is testing her glucose 6 -8 times a day  At this session the following were reviewed with her - filling and inserting a reservoir, entering bolus wizard information entering a single and multiple basal rates, giving a bolus dose, and suspending pump  Obie Corbett will be using the 670G [employer] pump> she is using a genic glucose meter at this time but will be using the Contour Next meter when she starts on the pump  She was given information on taking half of her basal insulin the night before her pump start

## 2018-10-08 NOTE — PATIENT INSTRUCTIONS
Schedule for pump start on 10/29/18 at 8:30 am     Bring to appointment  Reservoirs, vial of insulin, infusion set      Night before pump start take only 1/2 (6 units ) of insulin at bedtime

## 2018-10-15 DIAGNOSIS — E10.9 TYPE 1 DIABETES MELLITUS WITHOUT COMPLICATION (HCC): ICD-10-CM

## 2018-10-15 RX ORDER — BLOOD SUGAR DIAGNOSTIC
STRIP MISCELLANEOUS
Qty: 300 EACH | Refills: 3 | Status: SHIPPED | OUTPATIENT
Start: 2018-10-15 | End: 2018-12-04 | Stop reason: SDUPTHER

## 2018-10-16 ENCOUNTER — TELEPHONE (OUTPATIENT)
Dept: ENDOCRINOLOGY | Facility: CLINIC | Age: 23
End: 2018-10-16

## 2018-10-16 NOTE — TELEPHONE ENCOUNTER
Spoke patient's mother Rosanne Pretty and she stated that she has been battling with the insurance company to pay for the Contour next strips  advised I would call and see if a PA is needed  Called [facility] [phone] and was advised that the contour next strips are on the exclusion list and a PA will be denied instantly  Explained that the pt needs the contour strips to go with her [employer] 670G pump  Rep stated that nothing she could do because the strips are on the exclusion list     Called oRsanne Pretty back and provided the information above, she stated she is going to call the insurance company again    Told her in the mean time I submitted a PA on cover my meds just to see if it would actually get denied and will keep her posted once I receive an answer

## 2018-10-16 NOTE — TELEPHONE ENCOUNTER
Received notice from cover my meds   Request Reference Number: [id number]   CONTOUR HAYDER NEXT is denied due to Plan Exclusion

## 2018-10-20 ENCOUNTER — LAB (OUTPATIENT)
Dept: LAB | Facility: MEDICAL CENTER | Age: 23
End: 2018-10-20
Payer: COMMERCIAL

## 2018-10-20 DIAGNOSIS — E10.9 TYPE 1 DIABETES MELLITUS WITHOUT COMPLICATION (HCC): ICD-10-CM

## 2018-10-20 LAB
ALBUMIN SERPL BCP-MCNC: 3.7 G/DL (ref 3.5–5)
ALP SERPL-CCNC: 70 U/L (ref 46–116)
ALT SERPL W P-5'-P-CCNC: 18 U/L (ref 12–78)
ANION GAP SERPL CALCULATED.3IONS-SCNC: 5 MMOL/L (ref 4–13)
AST SERPL W P-5'-P-CCNC: 14 U/L (ref 5–45)
BILIRUB SERPL-MCNC: 0.27 MG/DL (ref 0.2–1)
BUN SERPL-MCNC: 10 MG/DL (ref 5–25)
CALCIUM SERPL-MCNC: 9.2 MG/DL (ref 8.3–10.1)
CHLORIDE SERPL-SCNC: 108 MMOL/L (ref 100–108)
CHOLEST SERPL-MCNC: 137 MG/DL (ref 50–200)
CO2 SERPL-SCNC: 26 MMOL/L (ref 21–32)
CREAT SERPL-MCNC: 0.65 MG/DL (ref 0.6–1.3)
CREAT UR-MCNC: 161 MG/DL
EST. AVERAGE GLUCOSE BLD GHB EST-MCNC: 117 MG/DL
GFR SERPL CREATININE-BSD FRML MDRD: 126 ML/MIN/1.73SQ M
GLUCOSE P FAST SERPL-MCNC: 106 MG/DL (ref 65–99)
HBA1C MFR BLD: 5.7 % (ref 4.2–6.3)
HDLC SERPL-MCNC: 53 MG/DL (ref 40–60)
LDLC SERPL CALC-MCNC: 75 MG/DL (ref 0–100)
MICROALBUMIN UR-MCNC: 6.6 MG/L (ref 0–20)
MICROALBUMIN/CREAT 24H UR: 4 MG/G CREATININE (ref 0–30)
NONHDLC SERPL-MCNC: 84 MG/DL
POTASSIUM SERPL-SCNC: 4.5 MMOL/L (ref 3.5–5.3)
PROT SERPL-MCNC: 7.5 G/DL (ref 6.4–8.2)
SODIUM SERPL-SCNC: 139 MMOL/L (ref 136–145)
TRIGL SERPL-MCNC: 46 MG/DL

## 2018-10-20 PROCEDURE — 82043 UR ALBUMIN QUANTITATIVE: CPT

## 2018-10-20 PROCEDURE — 82570 ASSAY OF URINE CREATININE: CPT

## 2018-10-20 PROCEDURE — 80053 COMPREHEN METABOLIC PANEL: CPT

## 2018-10-20 PROCEDURE — 80061 LIPID PANEL: CPT

## 2018-10-20 PROCEDURE — 3061F NEG MICROALBUMINURIA REV: CPT | Performed by: FAMILY MEDICINE

## 2018-10-20 PROCEDURE — 83036 HEMOGLOBIN GLYCOSYLATED A1C: CPT

## 2018-10-20 PROCEDURE — 36415 COLL VENOUS BLD VENIPUNCTURE: CPT

## 2018-10-22 DIAGNOSIS — E10.8 TYPE 1 DIABETES MELLITUS WITH COMPLICATION (HCC): Primary | ICD-10-CM

## 2018-10-23 ENCOUNTER — TELEPHONE (OUTPATIENT)
Dept: ENDOCRINOLOGY | Facility: CLINIC | Age: 23
End: 2018-10-23

## 2018-10-23 NOTE — TELEPHONE ENCOUNTER
----- Message from Abundio Stroud MD sent at 10/22/2018  8:10 PM EDT -----  The blood sugar is slightly elevated otherwise the testing is normal     Sent to my chart

## 2018-10-29 ENCOUNTER — OFFICE VISIT (OUTPATIENT)
Dept: DIABETES SERVICES | Facility: CLINIC | Age: 23
End: 2018-10-29

## 2018-10-29 ENCOUNTER — OFFICE VISIT (OUTPATIENT)
Dept: ENDOCRINOLOGY | Facility: CLINIC | Age: 23
End: 2018-10-29
Payer: COMMERCIAL

## 2018-10-29 VITALS
SYSTOLIC BLOOD PRESSURE: 126 MMHG | BODY MASS INDEX: 23.43 KG/M2 | DIASTOLIC BLOOD PRESSURE: 74 MMHG | WEIGHT: 111.6 LBS | HEIGHT: 58 IN | HEART RATE: 76 BPM

## 2018-10-29 DIAGNOSIS — Z79.4 LONG TERM CURRENT USE OF INSULIN (HCC): ICD-10-CM

## 2018-10-29 DIAGNOSIS — E10.9 TYPE 1 DIABETES MELLITUS WITHOUT COMPLICATION (HCC): ICD-10-CM

## 2018-10-29 DIAGNOSIS — Z96.41 INSULIN PUMP IN PLACE: ICD-10-CM

## 2018-10-29 DIAGNOSIS — E10.9 TYPE 1 DIABETES MELLITUS WITHOUT COMPLICATION (HCC): Primary | ICD-10-CM

## 2018-10-29 PROCEDURE — 99214 OFFICE O/P EST MOD 30 MIN: CPT | Performed by: PHYSICIAN ASSISTANT

## 2018-10-29 PROCEDURE — MPSTAR

## 2018-10-29 NOTE — ASSESSMENT & PLAN NOTE
She started on [employer] 670G pump today with plans for sensor training in near future then transition to automated mode  Will contact [employer] rep about getting coverage for contour next BG strips and attempt peer to peer  Follow up as scheduled with education  A1C has improved significantly to 5 7 since diagnose of Type 1 Diabetes about 5 months ago  Concerned for hypoglycemia with the low A1C and she should continue frequent glucose monitoring and start use of CGM/automode to prevent hypoglycemia  Reviewed backup plan with patient and RX for Lantus provided

## 2018-10-29 NOTE — PROGRESS NOTES
Sandie Hernandez was nervous regarding switching from injections to pump  Discussed reason for her concern and she is ready to start  She took 6 units of Lantus last evening  She was able to program her pump with her setting Basal 12 am - 0 075, I:C Ratio - 1:15, ISF - 60, target 120, IOB -4 hours  Reviewed giving a bolus dose using the wizard, hypo/hyperglycemia, backup plan  She is using the sure t infusion set and was able to self insert  She has numbers for office/[employer] and endocrinologist office to call for question or problems  She understands that only fast acting insuin is to be used in the pump and that she need to have Lantus available a incase of a pump removal   She was given insulin pump discharge instructions, DKA prevention for pump and discussed backup plan    She is to return to the office on 10/31/18

## 2018-10-29 NOTE — PATIENT INSTRUCTIONS
Follow Daniela's Instructions for meals/snacks/glucose monitoring  Follow up Wednesday as scheduled for infusion set change  Call tomorrow if any severe highs/lows/questions  We will work on [id number] to get contour next strips covered

## 2018-10-31 ENCOUNTER — OFFICE VISIT (OUTPATIENT)
Dept: DIABETES SERVICES | Facility: CLINIC | Age: 23
End: 2018-10-31

## 2018-10-31 DIAGNOSIS — E10.9 TYPE 1 DIABETES MELLITUS WITHOUT COMPLICATION (HCC): ICD-10-CM

## 2018-10-31 PROCEDURE — MSETCH

## 2018-10-31 NOTE — PATIENT INSTRUCTIONS
Change set every 2 days  Call if glucose are to low or high  Bring in sensor supplies at next appt  Charge transmitter before her next appt

## 2018-10-31 NOTE — PROGRESS NOTES
Hayden Goddard is seen today for a set change  She has experienced 2 lows yesterday   Pump was downloaded and she was sent a invite to link with our office  She was given instruction to register for [facility] to download at home  Her download was reviewed by Philip Junior and the following change was made in her basal setting  A ssecond basal was added at 12 n - 0 05 u/h, Hayden Goddard was able to make the changes in her basal pattern  Hayden Goddard was able to fill and change her reservoir and insert her sure t infusion set  She is planning on using the quickset after she uses all of  her sure t   She was shown how to use the quickset and the insertion device  I have asked her to send in a download in 1 week  If she is having any problems with high or low she is to call the endocrinologist office  She is scheduled for an appt in 2 week for sensor start

## 2018-11-05 ENCOUNTER — TELEPHONE (OUTPATIENT)
Dept: ENDOCRINOLOGY | Facility: CLINIC | Age: 23
End: 2018-11-05

## 2018-11-06 ENCOUNTER — TELEPHONE (OUTPATIENT)
Dept: ENDOCRINOLOGY | Facility: CLINIC | Age: 23
End: 2018-11-06

## 2018-11-07 ENCOUNTER — TELEPHONE (OUTPATIENT)
Dept: ENDOCRINOLOGY | Facility: CLINIC | Age: 23
End: 2018-11-07

## 2018-11-07 DIAGNOSIS — E10.9 TYPE 1 DIABETES MELLITUS WITHOUT COMPLICATION (HCC): Primary | ICD-10-CM

## 2018-11-07 NOTE — TELEPHONE ENCOUNTER
Got msg from patient that she is having lows 3-4 hours after a meal, lowest 66  Advised her for now change carb ratio from 15 to 18 and d/l pump in two weeks or call back sooner if problems

## 2018-11-20 ENCOUNTER — OFFICE VISIT (OUTPATIENT)
Dept: DIABETES SERVICES | Facility: CLINIC | Age: 23
End: 2018-11-20

## 2018-11-20 DIAGNOSIS — E10.9 TYPE 1 DIABETES MELLITUS WITHOUT COMPLICATION (HCC): ICD-10-CM

## 2018-11-20 PROCEDURE — MPSTAR

## 2018-11-20 NOTE — PROGRESS NOTES
Aki Lindo is seen today for a [employer]tronic sensor start  Topics discussed included: difference between the sensor and meter glucose reading, insertion of sensor, taping calibration on day 1 and then daily at least every 12 hours  Checklist was completed and she left the office in warm up mode  At this session she was also shown how to insert the quickset with the insertion device  Device  Her meter download was reviewed by CORA Vasquez and the I:C ratio was changed to 22  I have asked her to send in a download next week

## 2018-11-20 NOTE — PATIENT INSTRUCTIONS
Calibrate at least 3 times a day  Day 1 calibrate with in 2 hours of start, then within 6 hours, and then at least every 12 hours daily  Send in download in 1 week

## 2018-11-28 ENCOUNTER — TELEPHONE (OUTPATIENT)
Dept: DIABETES SERVICES | Facility: CLINIC | Age: 23
End: 2018-11-28

## 2018-11-28 NOTE — TELEPHONE ENCOUNTER
Left message for Lacy Vidales regarding her download  Reviewed by Dr Elizabeth Rendon recommend that she change pump settings to suspend on low  She can then have the alert on low feature turned on and she will be notified  This feature can be turned on or off for different time periods

## 2018-12-04 DIAGNOSIS — E10.9 TYPE 1 DIABETES MELLITUS WITHOUT COMPLICATION (HCC): ICD-10-CM

## 2018-12-04 RX ORDER — BLOOD SUGAR DIAGNOSTIC
STRIP MISCELLANEOUS
Qty: 300 EACH | Refills: 3 | Status: SHIPPED | OUTPATIENT
Start: 2018-12-04 | End: 2019-11-04 | Stop reason: SDUPTHER

## 2018-12-07 DIAGNOSIS — E10.9 TYPE 1 DIABETES MELLITUS WITHOUT COMPLICATION (HCC): Primary | ICD-10-CM

## 2018-12-20 RX ORDER — ACETAMINOPHEN AND CODEINE PHOSPHATE 120; 12 MG/5ML; MG/5ML
1 SOLUTION ORAL DAILY
Refills: 6 | COMMUNITY
Start: 2018-11-18 | End: 2019-04-15 | Stop reason: SDUPTHER

## 2018-12-21 ENCOUNTER — OFFICE VISIT (OUTPATIENT)
Dept: ENDOCRINOLOGY | Facility: CLINIC | Age: 23
End: 2018-12-21
Payer: COMMERCIAL

## 2018-12-21 VITALS
DIASTOLIC BLOOD PRESSURE: 72 MMHG | SYSTOLIC BLOOD PRESSURE: 124 MMHG | BODY MASS INDEX: 22.75 KG/M2 | WEIGHT: 108.4 LBS | HEART RATE: 82 BPM | HEIGHT: 58 IN

## 2018-12-21 DIAGNOSIS — Z79.4 LONG TERM CURRENT USE OF INSULIN (HCC): ICD-10-CM

## 2018-12-21 DIAGNOSIS — Z96.41 INSULIN PUMP IN PLACE: ICD-10-CM

## 2018-12-21 DIAGNOSIS — E10.9 TYPE 1 DIABETES MELLITUS WITHOUT COMPLICATION (HCC): Primary | ICD-10-CM

## 2018-12-21 PROCEDURE — 99213 OFFICE O/P EST LOW 20 MIN: CPT | Performed by: INTERNAL MEDICINE

## 2018-12-21 PROCEDURE — 95251 CONT GLUC MNTR ANALYSIS I&R: CPT | Performed by: INTERNAL MEDICINE

## 2018-12-21 NOTE — PROGRESS NOTES
Coy Montiel 21 y o  female MRN: [id number]    Encounter: [id number]      Assessment/Plan     Assessment: This is a 21y o -year-old female with diabetes with hyperglycemia  Plan:  1  Type 1 diabetes with hyper and hypoglycemia-overall, her blood sugars are under good control  Check hemoglobin A1c and CMP prior to the next visit  I did make adjustments to her settings  I changed her midnight basal rate to 0 05 units/hour since she is having some episodes of overnight hypoglycemia  We also discussed entering higher number carbohydrates for her breakfast to prevent despite that she is having  CC: Diabetes    History of Present Illness     HPI:  17-year-old woman with type 1 diabetes for about nine months  She is currently on a Medtronic 670 G pump with a continuous glucose monitor  She is unable to use auto mode since she is using very small amount of insulin  She does have overnight hypoglycemia  She denies any polyuria, polydipsia and nocturia  She has had an eye exam     Patient is on a Medtronic 670 G pump prescribed by me  She has been on this pump for less than a year  She denies any malfunctioning of the pump  Current Insulin pump settings:  Basal rate:  Midnight 0 075 units/hour and noon 0 05 units per  Insulin to carb ratio:  1 unit per 22 g  Insulin sensitivity factor:  1 unit per 60 mg/dL  BG target:  120 mg/dL  Type of insulin:  Humalog  Management of glucose reported by patient:  Good  Patient has had episodes of hypoglycemia  Diet:  Consistent carbohydrate  Discussed with patient in case of malfunctioning of the pump to use basal and bolus therapy as backup which is prescribed to the patient  Also notified patient to call clinic if any issues  Review of Systems   Constitutional: Negative for chills and fever  Respiratory: Negative for shortness of breath  Cardiovascular: Negative for chest pain     Gastrointestinal: Negative for constipation, diarrhea, nausea and vomiting  Endocrine: Negative for polydipsia and polyuria  All other systems reviewed and are negative  Historical Information   Past Medical History:   Diagnosis Date    Diabetes mellitus ([facility] [address] )     type 1    Migraine      Past Surgical History:   Procedure Laterality Date    MOUTH SURGERY  2012    TOOTH EXTRACTION      WISDOM TOOTH EXTRACTION       Social History   History   Alcohol Use No     Comment: rarely     History   Drug Use No     History   Smoking Status    Never Smoker   Smokeless Tobacco    Never Used     Family History:   Family History   Problem Relation Age of Onset    Hypertension Mother     Cancer Maternal Grandmother     Hypertension Maternal Grandmother     Cancer Maternal Grandfather     Diabetes Maternal Grandfather     Heart disease Maternal Grandfather     Hypertension Maternal Grandfather     Diabetes Paternal Grandmother     Heart disease Paternal Grandmother     Hypertension Paternal Grandmother     Arthritis Other     Diabetes Other     Arthritis Other     Diabetes Other     No Known Problems Father        Meds/Allergies   Current Outpatient Prescriptions   Medication Sig Dispense Refill    acetone, urine, test strip 100 strips by Does not apply route as needed for high blood sugar 100 each 3    CONTOUR NEXT TEST test strip Test 8-10 times a day as instructed 300 each 3    glucagon (GLUCAGON EMERGENCY) 1 MG injection Inject 1 mg under the skin once as needed for low blood sugar for up to 1 dose 2 each 3    insulin lispro (HumaLOG) 100 units/mL injection Use up to 50 units per day via pump 50 mL 1    Insulin Syringes, Disposable, U-100 1 ML MISC Using syringe daily to fill insulin cartridge as directed 30 each 6    norethindrone (MICRONOR) 0 35 MG tablet Take 1 tablet by mouth daily  6     No current facility-administered medications for this visit        Allergies   Allergen Reactions    No Active Allergies     No Known Allergies        Objective Vitals: Blood pressure 124/72, pulse 82, height 4' 10" (1 473 m), weight 49 2 kg (108 lb 6 4 oz)  Physical Exam   Constitutional: She is oriented to person, place, and time  She appears well-developed and well-nourished  No distress  HENT:   Head: Normocephalic and atraumatic  Mouth/Throat: Oropharynx is clear and moist and mucous membranes are normal  No oropharyngeal exudate  Eyes: Conjunctivae, EOM and lids are normal  Right eye exhibits no discharge  Left eye exhibits no discharge  No scleral icterus  Neck: Neck supple  No thyromegaly present  Cardiovascular: Normal rate, regular rhythm and normal heart sounds  Exam reveals no gallop and no friction rub  No murmur heard  Pulmonary/Chest: Effort normal and breath sounds normal  No respiratory distress  She has no wheezes  Abdominal: Soft  Bowel sounds are normal  She exhibits no distension  There is no tenderness  Musculoskeletal: Normal range of motion  She exhibits no edema, tenderness or deformity  Lymphadenopathy:        Head (right side): No occipital adenopathy present  Head (left side): No occipital adenopathy present  Right: No supraclavicular adenopathy present  Left: No supraclavicular adenopathy present  Neurological: She is alert and oriented to person, place, and time  No cranial nerve deficit  Skin: Skin is warm and intact  No rash noted  She is not diaphoretic  No erythema  Psychiatric: She has a normal mood and affect  Her behavior is normal    Vitals reviewed  The history was obtained from the review of the chart, patient and family      Lab Results:   Lab Results   Component Value Date/Time    Hemoglobin A1C 5 7 10/20/2018 09:03 AM    Hemoglobin A1C 13 0 06/13/2018 03:43 PM    BUN 10 10/20/2018 09:03 AM    Potassium 4 5 10/20/2018 09:03 AM    Chloride 108 10/20/2018 09:03 AM    CO2 26 10/20/2018 09:03 AM    Creatinine 0 65 10/20/2018 09:03 AM    AST 14 10/20/2018 09:03 AM    ALT 18 10/20/2018 09:03 AM    Albumin 3 7 10/20/2018 09:03 AM    HDL, Direct 53 10/20/2018 09:03 AM    Triglycerides 46 10/20/2018 09:03 AM     I did review her sensor download which shows that her sensor glucose is 118 mg/dL and her average blood glucose is 119 mg/dL  She is using her sensor 85% of the time  88% of the time she is in the target range  7% of the time her blood sugars between 180 and 250 mg/dL and 5% of the time her blood sugars between 50 and 70 mg/dL  Imaging Studies: I have personally reviewed pertinent reports  Portions of the record may have been created with voice recognition software  Occasional wrong word or "sound a like" substitutions may have occurred due to the inherent limitations of voice recognition software  Read the chart carefully and recognize, using context, where substitutions have occurred

## 2019-01-17 ENCOUNTER — TELEPHONE (OUTPATIENT)
Dept: OBGYN CLINIC | Facility: CLINIC | Age: 24
End: 2019-01-17

## 2019-01-21 NOTE — TELEPHONE ENCOUNTER
Spoke with pt - she is taking Micronor for her OCP - been on it for 3 months  Having irregular spotting  Advised pt that for 3-5 months after starting a new OCP - she may have the irregular bleeding  Also explained that with a progesterone only pill - she has to take it at the same time every day  Even if she is about an hour or so off- it could cause the irregular spotting  Recommended she set an alarm for herself  Stated she will

## 2019-03-12 ENCOUNTER — TELEPHONE (OUTPATIENT)
Dept: DIABETES SERVICES | Facility: CLINIC | Age: 24
End: 2019-03-12

## 2019-03-18 NOTE — TELEPHONE ENCOUNTER
Huma Issa was contacted to make the following change to her  Basal  Setting 12 mn - 0 075 if morning reading is above 140 after 1 week change to 0 100

## 2019-03-22 ENCOUNTER — LAB (OUTPATIENT)
Dept: LAB | Age: 24
End: 2019-03-22
Payer: COMMERCIAL

## 2019-03-22 DIAGNOSIS — E10.9 TYPE 1 DIABETES MELLITUS WITHOUT COMPLICATION (HCC): ICD-10-CM

## 2019-03-22 LAB
ALBUMIN SERPL BCP-MCNC: 3.9 G/DL (ref 3.5–5)
ALP SERPL-CCNC: 60 U/L (ref 46–116)
ALT SERPL W P-5'-P-CCNC: 16 U/L (ref 12–78)
ANION GAP SERPL CALCULATED.3IONS-SCNC: 4 MMOL/L (ref 4–13)
AST SERPL W P-5'-P-CCNC: 9 U/L (ref 5–45)
BILIRUB SERPL-MCNC: 0.41 MG/DL (ref 0.2–1)
BUN SERPL-MCNC: 16 MG/DL (ref 5–25)
CALCIUM SERPL-MCNC: 8.8 MG/DL (ref 8.3–10.1)
CHLORIDE SERPL-SCNC: 106 MMOL/L (ref 100–108)
CO2 SERPL-SCNC: 25 MMOL/L (ref 21–32)
CREAT SERPL-MCNC: 0.68 MG/DL (ref 0.6–1.3)
EST. AVERAGE GLUCOSE BLD GHB EST-MCNC: 134 MG/DL
GFR SERPL CREATININE-BSD FRML MDRD: 124 ML/MIN/1.73SQ M
GLUCOSE SERPL-MCNC: 117 MG/DL (ref 65–140)
HBA1C MFR BLD: 6.3 % (ref 4.2–6.3)
POTASSIUM SERPL-SCNC: 4.2 MMOL/L (ref 3.5–5.3)
PROT SERPL-MCNC: 7.4 G/DL (ref 6.4–8.2)
SODIUM SERPL-SCNC: 135 MMOL/L (ref 136–145)

## 2019-03-22 PROCEDURE — 83036 HEMOGLOBIN GLYCOSYLATED A1C: CPT

## 2019-03-22 PROCEDURE — 36415 COLL VENOUS BLD VENIPUNCTURE: CPT

## 2019-03-22 PROCEDURE — 80053 COMPREHEN METABOLIC PANEL: CPT

## 2019-03-28 ENCOUNTER — TELEPHONE (OUTPATIENT)
Dept: ENDOCRINOLOGY | Facility: CLINIC | Age: 24
End: 2019-03-28

## 2019-03-28 NOTE — RESULT ENCOUNTER NOTE
Please call the patient regarding her abnormal result  Diabetes is under good control      Sent to my chart

## 2019-04-11 DIAGNOSIS — Z30.41 ENCOUNTER FOR SURVEILLANCE OF CONTRACEPTIVE PILLS: ICD-10-CM

## 2019-04-15 DIAGNOSIS — Z30.41 ENCOUNTER FOR SURVEILLANCE OF CONTRACEPTIVE PILLS: Primary | ICD-10-CM

## 2019-04-15 RX ORDER — ACETAMINOPHEN AND CODEINE PHOSPHATE 120; 12 MG/5ML; MG/5ML
1 SOLUTION ORAL DAILY
Qty: 28 TABLET | Refills: 6 | Status: SHIPPED | OUTPATIENT
Start: 2019-04-15 | End: 2019-07-10 | Stop reason: ALTCHOICE

## 2019-04-15 RX ORDER — NORETHINDRONE 0.35 MG/1
TABLET ORAL
Qty: 28 TABLET | Refills: 6 | Status: SHIPPED | OUTPATIENT
Start: 2019-04-15 | End: 2019-10-28 | Stop reason: SDUPTHER

## 2019-04-19 ENCOUNTER — OFFICE VISIT (OUTPATIENT)
Dept: ENDOCRINOLOGY | Facility: CLINIC | Age: 24
End: 2019-04-19
Payer: COMMERCIAL

## 2019-04-19 VITALS
WEIGHT: 106.8 LBS | DIASTOLIC BLOOD PRESSURE: 74 MMHG | HEIGHT: 58 IN | BODY MASS INDEX: 22.42 KG/M2 | HEART RATE: 62 BPM | SYSTOLIC BLOOD PRESSURE: 118 MMHG

## 2019-04-19 DIAGNOSIS — E10.9 TYPE 1 DIABETES MELLITUS WITHOUT COMPLICATION (HCC): Primary | ICD-10-CM

## 2019-04-19 DIAGNOSIS — Z96.41 INSULIN PUMP IN PLACE: ICD-10-CM

## 2019-04-19 PROCEDURE — 99215 OFFICE O/P EST HI 40 MIN: CPT | Performed by: NURSE PRACTITIONER

## 2019-05-07 ENCOUNTER — TELEPHONE (OUTPATIENT)
Dept: DIABETES SERVICES | Facility: CLINIC | Age: 24
End: 2019-05-07

## 2019-06-12 DIAGNOSIS — E10.9 TYPE 1 DIABETES MELLITUS WITHOUT COMPLICATION (HCC): ICD-10-CM

## 2019-06-27 ENCOUNTER — TELEPHONE (OUTPATIENT)
Dept: ENDOCRINOLOGY | Facility: CLINIC | Age: 24
End: 2019-06-27

## 2019-07-03 ENCOUNTER — TELEPHONE (OUTPATIENT)
Dept: DIABETES SERVICES | Facility: CLINIC | Age: 24
End: 2019-07-03

## 2019-07-03 ENCOUNTER — TELEPHONE (OUTPATIENT)
Dept: ENDOCRINOLOGY | Facility: CLINIC | Age: 24
End: 2019-07-03

## 2019-07-03 NOTE — TELEPHONE ENCOUNTER
Please call patient    Increase Basal rates to   12AM 0 20 units/hr  6AM 0 1  12PM 0 1    Change 7AM and 3PM carb ratio from 16 to 15 due to post meal spikes  D/L pump in 1 week or Call ASAP if lows with changes  Set up with education for automode training now that she is using enough insulin to use automode

## 2019-07-03 NOTE — TELEPHONE ENCOUNTER
Spoke with Rosemarie Kennedy regarding changes in her pump setting to the following  Increase basal  Rate  12 am - 0 20  6 am - 0 1  12 pm - 0 1    Change insulin to carb ratio 7 am and 3 pm to 15  Call if problems with lows with these changes

## 2019-07-10 ENCOUNTER — OFFICE VISIT (OUTPATIENT)
Dept: FAMILY MEDICINE CLINIC | Facility: MEDICAL CENTER | Age: 24
End: 2019-07-10

## 2019-07-10 VITALS
BODY MASS INDEX: 21.11 KG/M2 | OXYGEN SATURATION: 98 % | HEART RATE: 71 BPM | DIASTOLIC BLOOD PRESSURE: 80 MMHG | TEMPERATURE: 97.6 F | SYSTOLIC BLOOD PRESSURE: 120 MMHG | WEIGHT: 101 LBS

## 2019-07-10 DIAGNOSIS — J30.89 ENVIRONMENTAL AND SEASONAL ALLERGIES: Primary | ICD-10-CM

## 2019-07-10 PROCEDURE — 99213 OFFICE O/P EST LOW 20 MIN: CPT | Performed by: FAMILY MEDICINE

## 2019-07-10 RX ORDER — IPRATROPIUM BROMIDE 42 UG/1
2 SPRAY, METERED NASAL 4 TIMES DAILY
Qty: 15 ML | Refills: 11 | Status: SHIPPED | OUTPATIENT
Start: 2019-07-10 | End: 2019-08-29 | Stop reason: ALTCHOICE

## 2019-07-10 RX ORDER — DESLORATADINE 5 MG/1
5 TABLET ORAL DAILY
Qty: 90 TABLET | Refills: 3 | Status: SHIPPED | OUTPATIENT
Start: 2019-07-10 | End: 2020-09-29

## 2019-07-17 ENCOUNTER — TELEPHONE (OUTPATIENT)
Dept: ENDOCRINOLOGY | Facility: CLINIC | Age: 24
End: 2019-07-17

## 2019-07-17 NOTE — TELEPHONE ENCOUNTER
Pump/sensor download reviewed  Improving but still al ittle high overal increase basal rates by 0 1 units/hr  Continue current settings follow up as scheduled 7/30 with Jeevan Tang or call asap if lows

## 2019-07-24 ENCOUNTER — APPOINTMENT (OUTPATIENT)
Dept: LAB | Age: 24
End: 2019-07-24
Payer: COMMERCIAL

## 2019-07-24 ENCOUNTER — TRANSCRIBE ORDERS (OUTPATIENT)
Dept: LAB | Age: 24
End: 2019-07-24

## 2019-07-24 DIAGNOSIS — E10.9 TYPE 1 DIABETES MELLITUS WITHOUT COMPLICATION (HCC): Primary | ICD-10-CM

## 2019-07-24 DIAGNOSIS — E10.9 TYPE 1 DIABETES MELLITUS WITHOUT COMPLICATION (HCC): ICD-10-CM

## 2019-07-24 LAB
ALBUMIN SERPL BCP-MCNC: 4.1 G/DL (ref 3.5–5)
ALP SERPL-CCNC: 71 U/L (ref 46–116)
ALT SERPL W P-5'-P-CCNC: 16 U/L (ref 12–78)
ANION GAP SERPL CALCULATED.3IONS-SCNC: 7 MMOL/L (ref 4–13)
AST SERPL W P-5'-P-CCNC: 13 U/L (ref 5–45)
BILIRUB SERPL-MCNC: 0.49 MG/DL (ref 0.2–1)
BUN SERPL-MCNC: 13 MG/DL (ref 5–25)
CALCIUM SERPL-MCNC: 8.8 MG/DL (ref 8.3–10.1)
CHLORIDE SERPL-SCNC: 107 MMOL/L (ref 100–108)
CO2 SERPL-SCNC: 25 MMOL/L (ref 21–32)
CREAT SERPL-MCNC: 0.62 MG/DL (ref 0.6–1.3)
EST. AVERAGE GLUCOSE BLD GHB EST-MCNC: 157 MG/DL
GFR SERPL CREATININE-BSD FRML MDRD: 128 ML/MIN/1.73SQ M
GLUCOSE P FAST SERPL-MCNC: 132 MG/DL (ref 65–99)
HBA1C MFR BLD: 7.1 % (ref 4.2–6.3)
POTASSIUM SERPL-SCNC: 4 MMOL/L (ref 3.5–5.3)
PROT SERPL-MCNC: 7.3 G/DL (ref 6.4–8.2)
SODIUM SERPL-SCNC: 139 MMOL/L (ref 136–145)

## 2019-07-24 PROCEDURE — 80053 COMPREHEN METABOLIC PANEL: CPT

## 2019-07-24 PROCEDURE — 36415 COLL VENOUS BLD VENIPUNCTURE: CPT

## 2019-07-24 PROCEDURE — 83036 HEMOGLOBIN GLYCOSYLATED A1C: CPT

## 2019-07-26 NOTE — PROGRESS NOTES
Established Patient Progress Note      Chief Complaint   Patient presents with    Diabetes Type 1          History of Present Illness:   Josh Gabriel is a 21 y o  female with a history of type 1 diabetes with long term use of insulin since June 2018  Reports no complications of diabetes  Last A1C 7 1  She is currently using Medtronic 670G with Humalog  Average time in range was 79% with average BG of 161  Her basal rates were recently increased and BG have been improving  Denies recent illness or hospitalizations  Denies recent severe hypoglycemic or severe hyperglycemic episodes  Denies any issues with her current regimen  Home glucose monitoring: are performed regularly      Current Insulin pump settings:  Basal rate: 12 am 0 30, 6 am 0 20, 12 am 0 20  Insulin to carb ratio: 12 am 22, 7 am 15, 3 pm 15  Insulin sensitivity factor: 60  BG target: 120  Active Insulin time: 4 hrs       Backup Plan: patient aware that in case of malfunctioning of the pump or unexplained hyperglycemia to use basal and bolus therapy as backup which is prescribed to the patient  Also notified patient to call clinic and/or pump company if any issues or go to emergency department if signs/symptoms of DKA  compliant all of the timedenies any side effects from current medications     Hypoglycemic episodes: No never   H/o of hypoglycemia causing hospitalization or Intervention such as glucagon injection or ambulance call No     Last Eye Exam: 07/03/18  Last Foot Exam: does not see podiatrist        Patient Active Problem List   Diagnosis    Breakthrough bleeding    Classic migraine with aura    Migraine headache    Tension type headache    Type 1 diabetes mellitus without complication (HCC)    Insulin pump in place    Environmental and seasonal allergies      Past Medical History:   Diagnosis Date    Diabetes mellitus ([address] )     type 1    Migraine       Past Surgical History:   Procedure Laterality Date    MOUTH SURGERY 2012    TOOTH EXTRACTION      WISDOM TOOTH EXTRACTION        Family History   Problem Relation Age of Onset    Hypertension Mother     Cancer Maternal Grandmother     Hypertension Maternal Grandmother     Cancer Maternal Grandfather     Diabetes Maternal Grandfather     Heart disease Maternal Grandfather     Hypertension Maternal Grandfather     Diabetes Paternal Grandmother     Heart disease Paternal Grandmother     Hypertension Paternal Grandmother     Arthritis Other     Diabetes Other     Arthritis Other     Diabetes Other     No Known Problems Father      Social History     Tobacco Use    Smoking status: Never Smoker    Smokeless tobacco: Never Used   Substance Use Topics    Alcohol use: No     Comment: rarely     Allergies   Allergen Reactions    No Active Allergies     No Known Allergies          Current Outpatient Medications:     LEOPOLDO 0 35 MG tablet, TAKE 1 TABLET BY MOUTH EVERY DAY, Disp: 28 tablet, Rfl: 6    CONTOUR NEXT TEST test strip, Test 8-10 times a day as instructed, Disp: 300 each, Rfl: 3    desloratadine (CLARINEX) 5 MG tablet, Take 1 tablet (5 mg total) by mouth daily, Disp: 90 tablet, Rfl: 3    glucagon (GLUCAGON EMERGENCY) 1 MG injection, Inject 1 mg under the skin once as needed for low blood sugar for up to 1 dose, Disp: 2 each, Rfl: 3    insulin lispro (HumaLOG) 100 units/mL injection pen, Use in case of pump failure, Disp: 5 pen, Rfl: 3    insulin lispro (HumaLOG) 100 units/mL injection, Use up to 50 units per day via pump, Disp: 50 mL, Rfl: 3    Insulin Syringes, Disposable, U-100 1 ML MISC, Using syringe daily to fill insulin cartridge as directed, Disp: 30 each, Rfl: 6    acetone, urine, test strip, 100 strips by Does not apply route as needed for high blood sugar (Patient not taking: Reported on 7/10/2019), Disp: 100 each, Rfl: 3    ipratropium (ATROVENT) 0 06 % nasal spray, 2 sprays into each nostril 4 (four) times a day (Patient not taking: Reported on 7/30/2019), Disp: 15 mL, Rfl: 11    Review of Systems   Constitutional: Negative for chills and fever  HENT: Negative for sore throat and trouble swallowing  Eyes: Negative for visual disturbance  Respiratory: Negative for shortness of breath  Cardiovascular: Negative for chest pain and palpitations  Gastrointestinal: Negative for abdominal pain, constipation and diarrhea  Endocrine: Negative for cold intolerance, heat intolerance, polydipsia, polyphagia and polyuria  Genitourinary: Negative for frequency  Musculoskeletal: Negative for arthralgias and myalgias  Skin: Negative for rash  Neurological: Negative for dizziness and tremors  Hematological: Negative for adenopathy  Psychiatric/Behavioral: Negative for sleep disturbance  All other systems reviewed and are negative  Physical Exam:  Body mass index is 21 57 kg/m²  /82   Pulse 96   Ht 4' 10" (1 473 m)   Wt 46 8 kg (103 lb 3 2 oz)   BMI 21 57 kg/m²    Wt Readings from Last 3 Encounters:   07/30/19 46 8 kg (103 lb 3 2 oz)   07/10/19 45 8 kg (101 lb)   04/19/19 48 4 kg (106 lb 12 8 oz)       Physical Exam   Constitutional: She is oriented to person, place, and time  She appears well-developed and well-nourished  No distress  HENT:   Head: Normocephalic and atraumatic  Eyes: Pupils are equal, round, and reactive to light  Conjunctivae are normal    Neck: Normal range of motion  Neck supple  No thyromegaly present  Cardiovascular: Normal rate, regular rhythm and normal heart sounds  Pulses are no weak pulses  Pulses:       Dorsalis pedis pulses are 2+ on the right side, and 2+ on the left side  Pulmonary/Chest: Effort normal and breath sounds normal  No respiratory distress  She has no wheezes  She has no rales  Abdominal: Soft  Bowel sounds are normal  She exhibits no distension  There is no tenderness  Musculoskeletal: Normal range of motion  She exhibits no edema     Feet:   Right Foot:   Skin Integrity: Negative for ulcer, skin breakdown, erythema, warmth, callus or dry skin  Left Foot:   Skin Integrity: Negative for ulcer, skin breakdown, erythema, warmth, callus or dry skin  Neurological: She is alert and oriented to person, place, and time  Skin: Skin is warm and dry  Psychiatric: She has a normal mood and affect  Vitals reviewed  Patient's shoes and socks removed  Right Foot/Ankle   Right Foot Inspection  Skin Exam: skin normal skin not intact, no dry skin, no warmth, no callus, no erythema, no maceration, no abnormal color, no pre-ulcer, no ulcer and no callus                            Sensory       Monofilament testing: intact  Vascular    The right DP pulse is 2+  Left Foot/Ankle  Left Foot Inspection  Skin Exam: skin normalskin not intact, no dry skin, no warmth, no erythema, no maceration, normal color, no pre-ulcer, no ulcer and no callus                                         Sensory       Monofilament: intact  Vascular    The left DP pulse is 2+  Assign Risk Category:  No deformity present; No loss of protective sensation;  No weak pulses       Risk: 0      Labs:     Lab Results   Component Value Date    HGBA1C 7 1 (H) 07/24/2019       Lab Results   Component Value Date    SODIUM 139 07/24/2019    K 4 0 07/24/2019     07/24/2019    CO2 25 07/24/2019    AGAP 7 07/24/2019    BUN 13 07/24/2019    CREATININE 0 62 07/24/2019    GLUC 117 03/22/2019    GLUF 132 (H) 07/24/2019    CALCIUM 8 8 07/24/2019    AST 13 07/24/2019    ALT 16 07/24/2019    ALKPHOS 71 07/24/2019    TP 7 3 07/24/2019    TBILI 0 49 07/24/2019    EGFR 128 07/24/2019         Lab Results   Component Value Date    HDL 53 10/20/2018    TRIG 46 10/20/2018       Lab Results   Component Value Date    GHD5KVXFPQMU 0 737 06/14/2018     Lab Results   Component Value Date    FREET4 1 13 06/14/2018       Impression & Plan:    Problem List Items Addressed This Visit        Endocrine    Type 1 diabetes mellitus without complication ([address] ) - Primary     Close to goal, BG improving with recent adjustment to basal rates  Continue current pump settings for now  Upload pump report in two weeks for review  Reviewed sick day management  Focus on dietary and lifestyle modifications  Relevant Orders    Hemoglobin A1C    Comprehensive metabolic panel    Lipid Panel with Direct LDL reflex    Microalbumin / creatinine urine ratio    T4, free    TSH, 3rd generation       Other    Insulin pump in place          Orders Placed This Encounter   Procedures    Hemoglobin A1C     Standing Status:   Future     Standing Expiration Date:   10/30/2019    Comprehensive metabolic panel     This is a patient instruction: Patient fasting for 8 hours or longer recommended  Standing Status:   Future     Standing Expiration Date:   7/30/2020    Lipid Panel with Direct LDL reflex     This is a patient instruction: This test requires patient fasting for 10-12 hours or longer  Drinking of black coffee or black tea is acceptable  Standing Status:   Future     Standing Expiration Date:   7/30/2020    Microalbumin / creatinine urine ratio     Standing Status:   Future     Standing Expiration Date:   7/30/2020    T4, free     Standing Status:   Future     Standing Expiration Date:   10/30/2019    TSH, 3rd generation     This is a patient instruction: This test is non-fasting  Please drink two glasses of water morning of bloodwork  Standing Status:   Future     Standing Expiration Date:   10/30/2019       Patient Instructions     Managing Diabetes During Sick Days   WHAT YOU NEED TO KNOW:   Sick day management is a plan you develop with healthcare providers to control your blood sugar level when you are sick  Your blood sugar level can rise because of stress from illness, surgery, or injury  Your plan will help prevent high blood sugar levels and other serious health conditions     DISCHARGE INSTRUCTIONS:   Call 911 for any of the following:   · You have trouble breathing  Return to the emergency department if:   · You cannot keep food and liquids down at all for a few hours  · You have trouble breathing  · You are drowsy or confused  · You are breathing faster than normal      · Your heartbeat is faster than normal, or your heart is pounding  · You are weak or dizzy  Contact your healthcare provider if:   · You have leg cramps  · Your mouth or eyes are dry  · You are vomiting or have diarrhea  · You have a fever  · Your ketone level is higher than healthcare providers have told you it should be  · Your blood sugar level is higher than healthcare providers have told you it should be  · You have questions or concerns about your condition or care  Medicines:   · Insulin or diabetes medicine  help to keep your blood sugar under control  Your healthcare provider will tell you if you need to make changes to how you use your diabetes medicine or insulin  · Take your medicine as directed  Contact your healthcare provider if you think your medicine is not helping or if you have side effects  Tell him of her if you are allergic to any medicine  Keep a list of the medicines, vitamins, and herbs you take  Include the amounts, and when and why you take them  Bring the list or the pill bottles to follow-up visits  Carry your medicine list with you in case of an emergency  Follow up with your healthcare provider as directed:  Write down your questions so you remember to ask them during your visits  What to do during sick days:   · Check your blood sugar level more often than usual   If you have type 2 diabetes, check at least 4 times each day  If you have type 1 diabetes, check every 4 hours  · Check your urine or blood for ketones  Ask your healthcare provider which type of ketone testing is best for you  Ketone urine test kits are sold in pharmacies and some stores   You can also buy a meter to check the amount of ketones in your blood  Ask when and how often to check ketones  Do not exercise if you have ketones in your urine or blood  · Drink liquids as directed  You may need to drink about 8 ounces (1 cup) of liquid each hour  Drink liquids that do not contain sugar  Ask your healthcare provider which liquids are best for you  · Follow your usual meal plan as closely as possible  If you cannot follow your meal plan, eat other foods that are easy for your body to digest  If you are eating less food than normal or cannot eat any foods, drink liquids that contain calories  · Tell others about your sick day plan  Tell others who help you while you are sick about your sick day plan  Put your plan in a place that is easy to find  Your sick day plan may change over time based on your needs  What to drink and eat while you are sick: If your stomach is upset or you are vomiting, the following may be easier to drink and eat  Each of the foods listed below has about 10 to 15 grams of carbohydrate:  · Liquids:      ¨ ? to ½ cup of fruit juice     ¨ ½ cup of regular soda     ¨ 1 cup of milk     ¨ 1 double-stick popsicle     ¨ 1 cup of a sports drink    · Foods:      ¨ ½ cup of regular gelatin or cooked, hot cereal     ¨ ½ cup of sugar-free pudding or ¼ cup of regular pudding     ¨ ½ cup of mashed potatoes, macaroni, or noodles     ¨ ¼ cup of sherbet     ¨ ½ cup of regular ice cream     ¨ 1 slice of dry toast, 6 saltine crackers, or 3 mark crackers  © 2017 [address]  Information is for End User's use only and may not be sold, redistributed or otherwise used for commercial purposes  All illustrations and images included in CareNotes® are the copyrighted property of [employer]  or Yuval Huynh  The above information is an  only  It is not intended as medical advice for individual conditions or treatments   Talk to your doctor, nurse or pharmacist before following any medical regimen to see if it is safe and effective for you  Discussed with the patient and all questioned fully answered  She will call me if any problems arise  Follow-up appointment in 3 months       Counseled patient on diagnostic results, prognosis, risk and benefit of treatment options, instruction for management, importance of treatment compliance, Risk  factor reduction and impressions      Aileen Bobby [address]

## 2019-07-30 ENCOUNTER — OFFICE VISIT (OUTPATIENT)
Dept: ENDOCRINOLOGY | Facility: CLINIC | Age: 24
End: 2019-07-30
Payer: COMMERCIAL

## 2019-07-30 VITALS
HEART RATE: 96 BPM | BODY MASS INDEX: 21.66 KG/M2 | DIASTOLIC BLOOD PRESSURE: 82 MMHG | HEIGHT: 58 IN | SYSTOLIC BLOOD PRESSURE: 120 MMHG | WEIGHT: 103.2 LBS

## 2019-07-30 DIAGNOSIS — Z96.41 INSULIN PUMP IN PLACE: ICD-10-CM

## 2019-07-30 DIAGNOSIS — E10.9 TYPE 1 DIABETES MELLITUS WITHOUT COMPLICATION (HCC): Primary | ICD-10-CM

## 2019-07-30 LAB
LEFT EYE DIABETIC RETINOPATHY: NORMAL
RIGHT EYE DIABETIC RETINOPATHY: NORMAL

## 2019-07-30 PROCEDURE — 3072F LOW RISK FOR RETINOPATHY: CPT | Performed by: FAMILY MEDICINE

## 2019-07-30 PROCEDURE — 99213 OFFICE O/P EST LOW 20 MIN: CPT | Performed by: NURSE PRACTITIONER

## 2019-07-30 NOTE — ASSESSMENT & PLAN NOTE
Close to goal, BG improving with recent adjustment to basal rates  Continue current pump settings for now  Upload pump report in two weeks for review  Reviewed sick day management  Focus on dietary and lifestyle modifications

## 2019-07-30 NOTE — PATIENT INSTRUCTIONS
Managing Diabetes During Sick Days   WHAT YOU NEED TO KNOW:   Sick day management is a plan you develop with healthcare providers to control your blood sugar level when you are sick  Your blood sugar level can rise because of stress from illness, surgery, or injury  Your plan will help prevent high blood sugar levels and other serious health conditions  DISCHARGE INSTRUCTIONS:   Call 911 for any of the following:   · You have trouble breathing  Return to the emergency department if:   · You cannot keep food and liquids down at all for a few hours  · You have trouble breathing  · You are drowsy or confused  · You are breathing faster than normal      · Your heartbeat is faster than normal, or your heart is pounding  · You are weak or dizzy  Contact your healthcare provider if:   · You have leg cramps  · Your mouth or eyes are dry  · You are vomiting or have diarrhea  · You have a fever  · Your ketone level is higher than healthcare providers have told you it should be  · Your blood sugar level is higher than healthcare providers have told you it should be  · You have questions or concerns about your condition or care  Medicines:   · Insulin or diabetes medicine  help to keep your blood sugar under control  Your healthcare provider will tell you if you need to make changes to how you use your diabetes medicine or insulin  · Take your medicine as directed  Contact your healthcare provider if you think your medicine is not helping or if you have side effects  Tell him of her if you are allergic to any medicine  Keep a list of the medicines, vitamins, and herbs you take  Include the amounts, and when and why you take them  Bring the list or the pill bottles to follow-up visits  Carry your medicine list with you in case of an emergency  Follow up with your healthcare provider as directed:  Write down your questions so you remember to ask them during your visits     What to do during sick days:   · Check your blood sugar level more often than usual   If you have type 2 diabetes, check at least 4 times each day  If you have type 1 diabetes, check every 4 hours  · Check your urine or blood for ketones  Ask your healthcare provider which type of ketone testing is best for you  Ketone urine test kits are sold in pharmacies and some stores  You can also buy a meter to check the amount of ketones in your blood  Ask when and how often to check ketones  Do not exercise if you have ketones in your urine or blood  · Drink liquids as directed  You may need to drink about 8 ounces (1 cup) of liquid each hour  Drink liquids that do not contain sugar  Ask your healthcare provider which liquids are best for you  · Follow your usual meal plan as closely as possible  If you cannot follow your meal plan, eat other foods that are easy for your body to digest  If you are eating less food than normal or cannot eat any foods, drink liquids that contain calories  · Tell others about your sick day plan  Tell others who help you while you are sick about your sick day plan  Put your plan in a place that is easy to find  Your sick day plan may change over time based on your needs  What to drink and eat while you are sick: If your stomach is upset or you are vomiting, the following may be easier to drink and eat   Each of the foods listed below has about 10 to 15 grams of carbohydrate:  · Liquids:      ¨ ? to ½ cup of fruit juice     ¨ ½ cup of regular soda     ¨ 1 cup of milk     ¨ 1 double-stick popsicle     ¨ 1 cup of a sports drink    · Foods:      ¨ ½ cup of regular gelatin or cooked, hot cereal     ¨ ½ cup of sugar-free pudding or ¼ cup of regular pudding     ¨ ½ cup of mashed potatoes, macaroni, or noodles     ¨ ¼ cup of sherbet     ¨ ½ cup of regular ice cream     ¨ 1 slice of dry toast, 6 saltine crackers, or 3 mark crackers  © 2017 [address] is for End User's use only and may not be sold, redistributed or otherwise used for commercial purposes  All illustrations and images included in CareNotes® are the copyrighted property of [employer] D A M [employer] Inc  or Yuval Huynh  The above information is an  only  It is not intended as medical advice for individual conditions or treatments  Talk to your doctor, nurse or pharmacist before following any medical regimen to see if it is safe and effective for you

## 2019-08-22 ENCOUNTER — TELEPHONE (OUTPATIENT)
Dept: FAMILY MEDICINE CLINIC | Facility: MEDICAL CENTER | Age: 24
End: 2019-08-22

## 2019-08-22 NOTE — TELEPHONE ENCOUNTER
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

## 2019-08-28 ENCOUNTER — TELEPHONE (OUTPATIENT)
Dept: ENDOCRINOLOGY | Facility: CLINIC | Age: 24
End: 2019-08-28

## 2019-08-28 NOTE — TELEPHONE ENCOUNTER
Left message for pt that her BG reading were in range 82% of the time to continue current regiment and setting for now

## 2019-08-29 ENCOUNTER — OFFICE VISIT (OUTPATIENT)
Dept: FAMILY MEDICINE CLINIC | Facility: MEDICAL CENTER | Age: 24
End: 2019-08-29
Payer: COMMERCIAL

## 2019-08-29 VITALS
DIASTOLIC BLOOD PRESSURE: 70 MMHG | WEIGHT: 102.6 LBS | RESPIRATION RATE: 16 BRPM | HEIGHT: 58 IN | HEART RATE: 76 BPM | SYSTOLIC BLOOD PRESSURE: 100 MMHG | BODY MASS INDEX: 21.54 KG/M2

## 2019-08-29 DIAGNOSIS — Z00.00 ROUTINE ADULT HEALTH MAINTENANCE: Primary | ICD-10-CM

## 2019-08-29 PROCEDURE — 99395 PREV VISIT EST AGE 18-39: CPT | Performed by: FAMILY MEDICINE

## 2019-08-29 RX ORDER — CLINDAMYCIN PHOSPHATE 10 MG/ML
SOLUTION TOPICAL
Refills: 6 | COMMUNITY
Start: 2019-07-26 | End: 2020-09-02 | Stop reason: ALTCHOICE

## 2019-08-29 RX ORDER — OLOPATADINE HYDROCHLORIDE 7 MG/ML
SOLUTION OPHTHALMIC
Refills: 3 | COMMUNITY
Start: 2019-08-20 | End: 2020-09-02 | Stop reason: ALTCHOICE

## 2019-08-29 NOTE — PROGRESS NOTES
Mahamed Roth is here for CPX  SH: Working as   Living at home  HH:  She exercises with her job  Diet  Is good  Dietary calcium several daily  Caffeine 1 cups daily  Alcohol none  Nonsmoker  FH: NO heart disease, stroke  Cousin with diabetes  NO colon cancer breast cancer    Gyn checkup next week  [facility]  No complaints  She was diagnosed with insulin-dependent diabetes last year  She is adjusting well  She has a continuous glucose monitor and insulin pump  Last A1c was 7 0    O: /70 (Cuff Size: Standard)   Pulse 76   Resp 16   Ht 4' 10" (1 473 m)   Wt 46 5 kg (102 lb 9 6 oz)   BMI 21 44 kg/m²   Physical Exam   Constitutional: She is oriented to person, place, and time  She appears well-developed and well-nourished  HENT:   Head: Normocephalic  Right Ear: External ear normal    Left Ear: External ear normal    Nose: Nose normal    Mouth/Throat: Oropharynx is clear and moist    Eyes: Pupils are equal, round, and reactive to light  Conjunctivae and EOM are normal  No scleral icterus  Neck: Normal range of motion  Neck supple  Carotid bruit is not present  No thyroid mass and no thyromegaly present  Cardiovascular: Normal rate, regular rhythm, normal heart sounds and intact distal pulses  Pulses:       Femoral pulses are 2+ on the right side, and 2+ on the left side  Popliteal pulses are 2+ on the right side, and 2+ on the left side  Dorsalis pedis pulses are 2+ on the right side, and 2+ on the left side  Posterior tibial pulses are 2+ on the right side, and 2+ on the left side  Pulmonary/Chest: Effort normal and breath sounds normal  No respiratory distress  She has no wheezes  She has no rales  Abdominal: Soft  Normal aorta and bowel sounds are normal  She exhibits no distension and no mass  There is no hepatosplenomegaly  There is no tenderness  Musculoskeletal: Normal range of motion  She exhibits no edema     Lymphadenopathy:        Head (right side): No submandibular and no occipital adenopathy present  Head (left side): No submandibular and no occipital adenopathy present  She has no cervical adenopathy  Right: No inguinal and no supraclavicular adenopathy present  Left: No inguinal and no supraclavicular adenopathy present  Neurological: She is oriented to person, place, and time  Skin: No lesion and no rash noted  Psychiatric: She has a normal mood and affect  Her behavior is normal       Blood work per Endo  cholesterol 137  Assessment  1  IDDM-per Endocrinology  Encouraged her to discuss exercise with her provider  2  Health maintenance- needs to check records for recent Tdap  Past 1 noted was 2009       Plan  AS abovel

## 2019-10-28 ENCOUNTER — ANNUAL EXAM (OUTPATIENT)
Dept: OBGYN CLINIC | Facility: MEDICAL CENTER | Age: 24
End: 2019-10-28
Payer: COMMERCIAL

## 2019-10-28 VITALS
HEIGHT: 58 IN | DIASTOLIC BLOOD PRESSURE: 74 MMHG | BODY MASS INDEX: 20.99 KG/M2 | SYSTOLIC BLOOD PRESSURE: 106 MMHG | WEIGHT: 100 LBS

## 2019-10-28 DIAGNOSIS — Z01.419 ENCOUNTER FOR GYNECOLOGICAL EXAMINATION: Primary | ICD-10-CM

## 2019-10-28 DIAGNOSIS — Z30.41 ENCOUNTER FOR SURVEILLANCE OF CONTRACEPTIVE PILLS: ICD-10-CM

## 2019-10-28 PROCEDURE — 99395 PREV VISIT EST AGE 18-39: CPT | Performed by: STUDENT IN AN ORGANIZED HEALTH CARE EDUCATION/TRAINING PROGRAM

## 2019-10-28 RX ORDER — ACETAMINOPHEN AND CODEINE PHOSPHATE 120; 12 MG/5ML; MG/5ML
1 SOLUTION ORAL DAILY
Qty: 28 TABLET | Refills: 11 | Status: SHIPPED | OUTPATIENT
Start: 2019-10-28 | End: 2020-02-12 | Stop reason: SDUPTHER

## 2019-10-28 NOTE — PROGRESS NOTES
Assessment/Plan:    Encounter for gynecological examination  22 yo G0 here for annual exam    Pap NILM 9/18  Discussed HPV vaccine, patient would like to pursue but declines injection today  Will return in 3 wks for first shot  Reviewed breast self awareness  Reviewed healthy diet and exercise  Sexually active without issues with one male  On Pops for contraception and currently amenorrheic on them  Declines STI screening  Diagnoses and all orders for this visit:    Encounter for gynecological examination          Subjective:      Patient ID: Samir Cabrera is a 21 y o  female  22 yo G0 here for annual exam  She is without complaints today  She is amenorrheic for the last 3 months on Pops  Very good about taking them the same time every day  She is sexually active with one male partner  No worries about STI  No problems with intercourse  The following portions of the patient's history were reviewed and updated as appropriate: allergies, current medications, past family history, past medical history, past social history, past surgical history and problem list     Review of Systems   Constitutional: Negative for chills and fever  Respiratory: Negative for shortness of breath  Cardiovascular: Negative for chest pain  Gastrointestinal: Negative for abdominal pain, constipation, diarrhea and nausea  Genitourinary: Negative for dysuria, frequency, pelvic pain, vaginal bleeding and vaginal discharge  Objective:      /74 (BP Location: Left arm, Patient Position: Sitting, Cuff Size: Standard)   Ht 4' 10" (1 473 m)   Wt 45 4 kg (100 lb)   BMI 20 90 kg/m²          Physical Exam   Constitutional: She appears well-developed and well-nourished  No distress  HENT:   Head: Normocephalic and atraumatic  Eyes: EOM are normal    Neck: Normal range of motion  Neck supple  No thyromegaly present     Pulmonary/Chest: Effort normal  Right breast exhibits no inverted nipple, no mass, no nipple discharge, no skin change and no tenderness  Left breast exhibits no inverted nipple, no mass, no nipple discharge, no skin change and no tenderness  Breasts are symmetrical    Abdominal: Soft  She exhibits no distension and no mass  There is no tenderness  There is no rebound and no guarding  Genitourinary: Vagina normal and uterus normal  Pelvic exam was performed with patient supine  There is no rash, tenderness, lesion or injury on the right labia  There is no rash, tenderness, lesion or injury on the left labia  Uterus is not enlarged and not tender  Cervix exhibits no motion tenderness, no discharge and no friability  Right adnexum displays no mass, no tenderness and no fullness  Left adnexum displays no mass, no tenderness and no fullness  No erythema, tenderness or bleeding in the vagina  No vaginal discharge found  Genitourinary Comments: Small anteverted uterus  Skin: She is not diaphoretic

## 2019-10-28 NOTE — ASSESSMENT & PLAN NOTE
20 yo G0 here for annual exam    Pap NILM 9/18  Discussed HPV vaccine, patient would like to pursue but declines injection today  Will return in 3 wks for first shot  Reviewed breast self awareness  Reviewed healthy diet and exercise  Sexually active without issues with one male  On Pops for contraception and currently amenorrheic on them  Declines STI screening

## 2019-11-04 DIAGNOSIS — E10.9 TYPE 1 DIABETES MELLITUS WITHOUT COMPLICATION (HCC): ICD-10-CM

## 2019-11-04 RX ORDER — BLOOD SUGAR DIAGNOSTIC
STRIP MISCELLANEOUS
Qty: 300 EACH | Refills: 3 | Status: SHIPPED | OUTPATIENT
Start: 2019-11-04 | End: 2020-02-27

## 2019-11-09 ENCOUNTER — APPOINTMENT (OUTPATIENT)
Dept: LAB | Age: 24
End: 2019-11-09
Payer: COMMERCIAL

## 2019-11-09 ENCOUNTER — TRANSCRIBE ORDERS (OUTPATIENT)
Dept: ADMINISTRATIVE | Age: 24
End: 2019-11-09

## 2019-11-09 DIAGNOSIS — E10.9 TYPE 1 DIABETES MELLITUS WITHOUT COMPLICATION (HCC): Primary | ICD-10-CM

## 2019-11-09 DIAGNOSIS — E10.9 TYPE 1 DIABETES MELLITUS WITHOUT COMPLICATION (HCC): ICD-10-CM

## 2019-11-09 PROCEDURE — 84443 ASSAY THYROID STIM HORMONE: CPT

## 2019-11-09 PROCEDURE — 82570 ASSAY OF URINE CREATININE: CPT

## 2019-11-09 PROCEDURE — 83036 HEMOGLOBIN GLYCOSYLATED A1C: CPT

## 2019-11-09 PROCEDURE — 84439 ASSAY OF FREE THYROXINE: CPT

## 2019-11-09 PROCEDURE — 80053 COMPREHEN METABOLIC PANEL: CPT

## 2019-11-09 PROCEDURE — 82043 UR ALBUMIN QUANTITATIVE: CPT

## 2019-11-09 PROCEDURE — 80061 LIPID PANEL: CPT

## 2019-11-09 PROCEDURE — 36415 COLL VENOUS BLD VENIPUNCTURE: CPT

## 2019-11-10 LAB
ALBUMIN SERPL BCP-MCNC: 3.9 G/DL (ref 3.5–5)
ALP SERPL-CCNC: 68 U/L (ref 46–116)
ALT SERPL W P-5'-P-CCNC: 14 U/L (ref 12–78)
ANION GAP SERPL CALCULATED.3IONS-SCNC: 4 MMOL/L (ref 4–13)
AST SERPL W P-5'-P-CCNC: 8 U/L (ref 5–45)
BILIRUB SERPL-MCNC: 0.31 MG/DL (ref 0.2–1)
BUN SERPL-MCNC: 13 MG/DL (ref 5–25)
CALCIUM SERPL-MCNC: 9 MG/DL (ref 8.3–10.1)
CHLORIDE SERPL-SCNC: 109 MMOL/L (ref 100–108)
CHOLEST SERPL-MCNC: 141 MG/DL (ref 50–200)
CO2 SERPL-SCNC: 26 MMOL/L (ref 21–32)
CREAT SERPL-MCNC: 0.69 MG/DL (ref 0.6–1.3)
CREAT UR-MCNC: 106 MG/DL
EST. AVERAGE GLUCOSE BLD GHB EST-MCNC: 154 MG/DL
GFR SERPL CREATININE-BSD FRML MDRD: 123 ML/MIN/1.73SQ M
GLUCOSE P FAST SERPL-MCNC: 137 MG/DL (ref 65–99)
HBA1C MFR BLD: 7 % (ref 4.2–6.3)
HDLC SERPL-MCNC: 57 MG/DL
LDLC SERPL CALC-MCNC: 79 MG/DL (ref 0–100)
MICROALBUMIN UR-MCNC: 6.3 MG/L (ref 0–20)
MICROALBUMIN/CREAT 24H UR: 6 MG/G CREATININE (ref 0–30)
POTASSIUM SERPL-SCNC: 4.5 MMOL/L (ref 3.5–5.3)
PROT SERPL-MCNC: 7.3 G/DL (ref 6.4–8.2)
SODIUM SERPL-SCNC: 139 MMOL/L (ref 136–145)
T4 FREE SERPL-MCNC: 0.97 NG/DL (ref 0.76–1.46)
TRIGL SERPL-MCNC: 23 MG/DL
TSH SERPL DL<=0.05 MIU/L-ACNC: 0.6 UIU/ML (ref 0.36–3.74)

## 2019-11-26 NOTE — PROGRESS NOTES
Established Patient Progress Note      Chief Complaint   Patient presents with    Diabetes Type 1          History of Present Illness:   Hung Gaviria is a 21 y o  female with a history of  type 1 diabetes with long term use of insulin since June 2018  Reports no complications of diabetes  Last A1C 7 0  She is currently using Medtronic 670G  Review of pump sensor report shows time in range 82% with average BG of 140  Occasionally will spike after lunch  Denies recent illness or hospitalizations  Denies recent severe hypoglycemic or severe hyperglycemic episodes  Denies any issues with her current regimen  Home glucose monitoring: are performed regularly      Patient is on a Medtronic 670G pump prescribed by endocrinologist  She has been on a pump since 10/29/18  She denies any malfunctioning of the pump  Current Insulin pump settings:  Basal rate: 12 am 0 375, 6 am 0 200, 12 pm 0 200  Insulin to carb ratio: 12 am 22, 7 am 12, 3 pm 12  Insulin sensitivity factor: 60  BG target: 120  Active Insulin time: 4 hrs    Type of insulin: Humalog    Backup Plan: patient aware that in case of malfunctioning of the pump or unexplained hyperglycemia to use basal and bolus therapy as backup which is prescribed to the patient  Also notified patient to call clinic and/or pump company if any issues or go to emergency department if signs/symptoms of DKA  compliant all of the timedenies any side effects from current medications     Hypoglycemic episodes: Yes rare   H/o of hypoglycemia causing hospitalization or Intervention such as glucagon injection or ambulance call No   Hypoglycemia symptoms: dizziness, headache and sweating   Treatment of hypoglycemia: orange juice     Last Eye Exam: 7/30/19  Last Foot Exam: 7/30/19      Patient Active Problem List   Diagnosis    Breakthrough bleeding    Classic migraine with aura    Migraine headache    Tension type headache    [facility] without complication ([facility]    Insulin pump in place    Environmental and seasonal allergies    Encounter for gynecological examination      Past Medical History:   Diagnosis Date    Diabetes mellitus ([facility] )     type 1    Migraine       Past Surgical History:   Procedure Laterality Date    MOUTH SURGERY  2012    TOOTH EXTRACTION      WISDOM TOOTH EXTRACTION        Family History   Problem Relation Age of Onset    Hypertension Mother     Cancer Maternal Grandmother     Hypertension Maternal Grandmother     Cancer Maternal Grandfather     Diabetes Maternal Grandfather     Heart disease Maternal Grandfather     Hypertension Maternal Grandfather     Diabetes Paternal Grandmother     Heart disease Paternal Grandmother     Hypertension Paternal Grandmother     Arthritis Other     Diabetes Other     Arthritis Other     Diabetes Other     No Known Problems Father      Social History     Tobacco Use    Smoking status: Never Smoker    Smokeless tobacco: Never Used   Substance Use Topics    Alcohol use: No     Comment: rarely     Allergies   Allergen Reactions    No Active Allergies     No Known Allergies          Current Outpatient Medications:     clindamycin (CLEOCIN T) 1 %, APPLY TO BACK AND FACE, Disp: , Rfl: 6    CONTOUR NEXT TEST test strip, Test 8-10 times a day as instructed, Disp: 300 each, Rfl: 3    desloratadine (CLARINEX) 5 MG tablet, Take 1 tablet (5 mg total) by mouth daily (Patient taking differently: Take 5 mg by mouth daily as needed ), Disp: 90 tablet, Rfl: 3    insulin lispro (HumaLOG) 100 units/mL injection pen, Use in case of pump failure, Disp: 5 pen, Rfl: 3    insulin lispro (HumaLOG) 100 units/mL injection, Use up to 50 units per day via pump, Disp: 50 mL, Rfl: 3    Insulin Syringes, Disposable, U-100 1 ML MISC, Using syringe daily to fill insulin cartridge as directed, Disp: 30 each, Rfl: 6    norethindrone (LEOPOLDO) 0 35 MG tablet, Take 1 tablet (0 35 mg total) by mouth daily, Disp: 28 tablet, Rfl: 11    PAZEO 0 7 % SOLN, PUT 1 DROP INTO BOTH EYES EVERY DAY, Disp: , Rfl: 3    acetone, urine, test strip, 100 strips by Does not apply route as needed for high blood sugar (Patient not taking: Reported on 11/27/2019), Disp: 100 each, Rfl: 3    BAQSIMI TWO PACK 3 MG/DOSE POWD, 3 mg into each nostril as needed (hypoglycemic emergency) Baqsimi, brand necessary, Disp: 2 each, Rfl: 1    Review of Systems   Constitutional: Negative for chills and fever  HENT: Negative for sore throat and trouble swallowing  Eyes: Negative for visual disturbance  Respiratory: Negative for shortness of breath  Cardiovascular: Negative for chest pain and palpitations  Gastrointestinal: Negative for abdominal pain, constipation and diarrhea  Endocrine: Negative for cold intolerance, heat intolerance, polydipsia, polyphagia and polyuria  Genitourinary: Negative for frequency  Musculoskeletal: Negative for arthralgias and myalgias  Skin: Negative for rash  Neurological: Negative for dizziness and tremors  Hematological: Negative for adenopathy  Psychiatric/Behavioral: Negative for sleep disturbance  All other systems reviewed and are negative  Physical Exam:  Body mass index is 21 32 kg/m²  /64 (BP Location: Left arm, Patient Position: Sitting, Cuff Size: Standard)   Pulse 88   Ht 4' 10" (1 473 m)   Wt 46 3 kg (102 lb)   BMI 21 32 kg/m²    Wt Readings from Last 3 Encounters:   11/27/19 46 3 kg (102 lb)   10/28/19 45 4 kg (100 lb)   08/29/19 46 5 kg (102 lb 9 6 oz)       Physical Exam   Constitutional: She is oriented to person, place, and time  She appears well-developed and well-nourished  No distress  HENT:   Head: Normocephalic and atraumatic  Eyes: Pupils are equal, round, and reactive to light  Conjunctivae are normal    Neck: Normal range of motion  Neck supple  No thyromegaly present  Cardiovascular: Normal rate, regular rhythm and normal heart sounds     Pulmonary/Chest: Effort normal and breath sounds normal  No respiratory distress  She has no wheezes  She has no rales  Abdominal: Soft  Bowel sounds are normal  She exhibits no distension  There is no tenderness  Musculoskeletal: Normal range of motion  She exhibits no edema  Neurological: She is alert and oriented to person, place, and time  Skin: Skin is warm and dry  Psychiatric: She has a normal mood and affect  Vitals reviewed  Labs:     Lab Results   Component Value Date    HGBA1C 7 0 (H) 11/09/2019       Lab Results   Component Value Date    SODIUM 139 11/09/2019    K 4 5 11/09/2019     (H) 11/09/2019    CO2 26 11/09/2019    AGAP 4 11/09/2019    BUN 13 11/09/2019    CREATININE 0 69 11/09/2019    GLUC 117 03/22/2019    GLUF 137 (H) 11/09/2019    CALCIUM 9 0 11/09/2019    AST 8 11/09/2019    ALT 14 11/09/2019    ALKPHOS 68 11/09/2019    TP 7 3 11/09/2019    TBILI 0 31 11/09/2019    EGFR 123 11/09/2019         Lab Results   Component Value Date    HDL 57 11/09/2019    TRIG 23 11/09/2019       Lab Results   Component Value Date    [id number] 0 599 11/09/2019    [id number] 0 737 06/14/2018     Lab Results   Component Value Date    FREET4 0 97 11/09/2019       Impression & Plan:    Problem List Items Addressed This Visit        Endocrine    Type 1 diabetes mellitus without complication ([facility] [address] ) - Primary     Close to goal  Occasional hyperglycemia after lunch  Add carb ratio at 11 am of 11  Upload pump/sensor report in two weeks for review  Focus on dietary and lifestyle modifications            Relevant Medications    BAQSIMI TWO PACK 3 MG/DOSE POWD    Other Relevant Orders    Hemoglobin A1C    Comprehensive metabolic panel    Lipid Panel with Direct LDL reflex       Other    Insulin pump in place          Orders Placed This Encounter   Procedures    Hemoglobin A1C     Standing Status:   Future     Standing Expiration Date:   11/27/2020    Comprehensive metabolic panel     This is a patient instruction: Patient fasting for 8 hours or longer recommended  Standing Status:   Future     Standing Expiration Date:   11/27/2020    Lipid Panel with Direct LDL reflex     This is a patient instruction: This test requires patient fasting for 10-12 hours or longer  Drinking of black coffee or black tea is acceptable  Standing Status:   Future     Standing Expiration Date:   11/27/2020       Patient Instructions   Add card ratio at 11 am to 11 g of carbs         Discussed with the patient and all questioned fully answered  She will call me if any problems arise  Follow-up appointment in 3 months       Counseled patient on diagnostic results, prognosis, risk and benefit of treatment options, instruction for management, importance of treatment compliance, Risk  factor reduction and impressions      Aileen Bobby [address]

## 2019-11-27 ENCOUNTER — OFFICE VISIT (OUTPATIENT)
Dept: ENDOCRINOLOGY | Facility: CLINIC | Age: 24
End: 2019-11-27
Payer: COMMERCIAL

## 2019-11-27 VITALS
SYSTOLIC BLOOD PRESSURE: 100 MMHG | WEIGHT: 102 LBS | HEIGHT: 58 IN | BODY MASS INDEX: 21.41 KG/M2 | HEART RATE: 88 BPM | DIASTOLIC BLOOD PRESSURE: 64 MMHG

## 2019-11-27 DIAGNOSIS — E10.9 TYPE 1 DIABETES MELLITUS WITHOUT COMPLICATION (HCC): Primary | ICD-10-CM

## 2019-11-27 DIAGNOSIS — Z96.41 INSULIN PUMP IN PLACE: ICD-10-CM

## 2019-11-27 PROCEDURE — 99215 OFFICE O/P EST HI 40 MIN: CPT | Performed by: NURSE PRACTITIONER

## 2019-11-27 RX ORDER — GLUCAGON 3 MG/1
3 POWDER NASAL AS NEEDED
Qty: 2 EACH | Refills: 1 | Status: SHIPPED | OUTPATIENT
Start: 2019-11-27 | End: 2021-09-22 | Stop reason: SDUPTHER

## 2020-02-11 ENCOUNTER — TELEPHONE (OUTPATIENT)
Dept: OBGYN CLINIC | Facility: CLINIC | Age: 25
End: 2020-02-11

## 2020-02-11 DIAGNOSIS — Z30.41 ENCOUNTER FOR SURVEILLANCE OF CONTRACEPTIVE PILLS: ICD-10-CM

## 2020-02-11 NOTE — TELEPHONE ENCOUNTER
Pt has been having spotting on the pill almost every day for the last 2 months  She has been on norethindrone for a while and wonders if the pill has been switched to a generic

## 2020-02-11 NOTE — TELEPHONE ENCOUNTER
Spoke with pt who states she has been spotting daily on the norethindrone  She feels it is better on the nasim, but thinks her ins will probably  Not cover the brand  req further instructions as spotting has become very annoying

## 2020-02-12 RX ORDER — NORETHINDRONE 0.35 MG/1
1 TABLET ORAL DAILY
Qty: 28 TABLET | Refills: 8 | Status: SHIPPED | OUTPATIENT
Start: 2020-02-12 | End: 2020-09-15

## 2020-02-12 NOTE — TELEPHONE ENCOUNTER
Sent new Rx to pharmacy requesting name brand only  If unable to fill request or too expensive have her call back  BTB on POP is normal  If no improvement or cannot get natalie should come in for exam and discussion of other methods

## 2020-02-17 ENCOUNTER — APPOINTMENT (OUTPATIENT)
Dept: LAB | Age: 25
End: 2020-02-17
Payer: COMMERCIAL

## 2020-02-17 DIAGNOSIS — E10.9 TYPE 1 DIABETES MELLITUS WITHOUT COMPLICATION (HCC): ICD-10-CM

## 2020-02-17 LAB
ALBUMIN SERPL BCP-MCNC: 3.9 G/DL (ref 3.5–5)
ALP SERPL-CCNC: 61 U/L (ref 46–116)
ALT SERPL W P-5'-P-CCNC: 13 U/L (ref 12–78)
ANION GAP SERPL CALCULATED.3IONS-SCNC: 6 MMOL/L (ref 4–13)
AST SERPL W P-5'-P-CCNC: 10 U/L (ref 5–45)
BILIRUB SERPL-MCNC: 0.34 MG/DL (ref 0.2–1)
BUN SERPL-MCNC: 12 MG/DL (ref 5–25)
CALCIUM SERPL-MCNC: 8.8 MG/DL (ref 8.3–10.1)
CHLORIDE SERPL-SCNC: 109 MMOL/L (ref 100–108)
CHOLEST SERPL-MCNC: 141 MG/DL (ref 50–200)
CO2 SERPL-SCNC: 24 MMOL/L (ref 21–32)
CREAT SERPL-MCNC: 0.61 MG/DL (ref 0.6–1.3)
EST. AVERAGE GLUCOSE BLD GHB EST-MCNC: 131 MG/DL
GFR SERPL CREATININE-BSD FRML MDRD: 127 ML/MIN/1.73SQ M
GLUCOSE P FAST SERPL-MCNC: 118 MG/DL (ref 65–99)
HBA1C MFR BLD: 6.2 %
HDLC SERPL-MCNC: 62 MG/DL
LDLC SERPL CALC-MCNC: 74 MG/DL (ref 0–100)
POTASSIUM SERPL-SCNC: 4.3 MMOL/L (ref 3.5–5.3)
PROT SERPL-MCNC: 7.2 G/DL (ref 6.4–8.2)
SODIUM SERPL-SCNC: 139 MMOL/L (ref 136–145)
TRIGL SERPL-MCNC: 25 MG/DL

## 2020-02-17 PROCEDURE — 36415 COLL VENOUS BLD VENIPUNCTURE: CPT

## 2020-02-17 PROCEDURE — 80061 LIPID PANEL: CPT

## 2020-02-17 PROCEDURE — 83036 HEMOGLOBIN GLYCOSYLATED A1C: CPT

## 2020-02-17 PROCEDURE — 80053 COMPREHEN METABOLIC PANEL: CPT

## 2020-02-19 ENCOUNTER — TELEPHONE (OUTPATIENT)
Dept: ENDOCRINOLOGY | Facility: CLINIC | Age: 25
End: 2020-02-19

## 2020-02-19 NOTE — TELEPHONE ENCOUNTER
----- Message from [place] sent at 2/19/2020  1:16 PM EST -----  Please call the patient regarding her abnormal result  A1C has improved, now at goal (6 2)   Rest of labs normal

## 2020-02-26 NOTE — PROGRESS NOTES
Established Patient Progress Note      Chief Complaint   Patient presents with    Diabetes Type 1          History of Present Illness:   Desirae Urbano is a 25 y o  female with a history of type 1 diabetes with long term use of insulin since June 2018  Reports no complications of diabetes  Last A1C 6 2  She is currently using Medtronic 670G  Review of pump report shows [facility] BG of 147 with time in range 82%  She does have frequent episodes of hyperglycemia in the evening  Denies recent illness or hospitalizations  Denies recent severe hypoglycemic or severe hyperglycemic episodes  Denies any issues with her current regimen  Home glucose monitoring: are performed regularly      Patient is on a Medtronic 670G pump prescribed by endocrinologist  She has been on a pump since 10/29/18  She denies any malfunctioning of the pump  Current Insulin pump settings:  Basal rate: 12 am 0 525, 6:30 am 0 250, 12 pm 0 200  Insulin to carb ratio: 12 am 22, 7 am 11, 3 pm 11  Insulin sensitivity factor: 60  BG target: 120  Active Insulin time: 4 hrs    Type of insulin:  Humalog     Backup Plan: patient aware that in case of malfunctioning of the pump or unexplained hyperglycemia to use basal and bolus therapy as backup which is prescribed to the patient  Also notified patient to call clinic and/or pump company if any issues or go to emergency department if signs/symptoms of DKA  compliant all of the timedenies any side effects from current medications     Hypoglycemic episodes: Yes rare   H/o of hypoglycemia causing hospitalization or Intervention such as glucagon injection or ambulance call No   Hypoglycemia symptoms: dizziness, headache, hunger and sweating   Treatment of hypoglycemia: orange juice     Last Eye Exam: 7/30/19  Last Foot Exam: 7/30/19    Patient Active Problem List   Diagnosis    Breakthrough bleeding    Classic migraine with aura    Migraine headache    Tension type headache    Type 1 diabetes mellitus without complication (HCC)    Insulin pump in place    Environmental and seasonal allergies    Encounter for gynecological examination      Past Medical History:   Diagnosis Date    Diabetes mellitus ([facility] [address] )     type 1    Migraine       Past Surgical History:   Procedure Laterality Date    MOUTH SURGERY  2012    TOOTH EXTRACTION      WISDOM TOOTH EXTRACTION        Family History   Problem Relation Age of Onset    Hypertension Mother     Cancer Maternal Grandmother     Hypertension Maternal Grandmother     Cancer Maternal Grandfather     Diabetes Maternal Grandfather     Heart disease Maternal Grandfather     Hypertension Maternal Grandfather     Diabetes Paternal Grandmother     Heart disease Paternal Grandmother     Hypertension Paternal Grandmother     Arthritis Other     Diabetes Other     Arthritis Other     Diabetes Other     No Known Problems Father      Social History     Tobacco Use    Smoking status: Never Smoker    Smokeless tobacco: Never Used   Substance Use Topics    Alcohol use: No     Comment: rarely     Allergies   Allergen Reactions    No Active Allergies     No Known Allergies          Current Outpatient Medications:     LEOPOLDO 0 35 MG tablet, Take 1 tablet (0 35 mg total) by mouth daily, Disp: 28 tablet, Rfl: 8    clindamycin (CLEOCIN T) 1 %, APPLY TO BACK AND FACE, Disp: , Rfl: 6    desloratadine (CLARINEX) 5 MG tablet, Take 1 tablet (5 mg total) by mouth daily (Patient taking differently: Take 5 mg by mouth daily as needed ), Disp: 90 tablet, Rfl: 3    insulin lispro (HumaLOG) 100 units/mL injection, Use up to 50 units per day via pump, Disp: 50 mL, Rfl: 3    acetone, urine, test strip, 100 strips by Does not apply route as needed for high blood sugar (Patient not taking: Reported on 11/27/2019), Disp: 100 each, Rfl: 3    BAQSIMI TWO PACK 3 MG/DOSE POWD, 3 mg into each nostril as needed (hypoglycemic emergency) Baqsimi, brand necessary, Disp: 2 each, Rfl: 1    CONTOUR NEXT TEST test strip, TEST 8-10 TIMES A DAY AS INSTRUCTED, Disp: 900 each, Rfl: 1    insulin lispro (HumaLOG) 100 units/mL injection pen, Use in case of pump failure, Disp: 5 pen, Rfl: 3    Insulin Syringes, Disposable, U-100 1 ML MISC, Using syringe daily to fill insulin cartridge as directed, Disp: 30 each, Rfl: 6    PAZEO 0 7 % SOLN, PUT 1 DROP INTO BOTH EYES EVERY DAY, Disp: , Rfl: 3    Review of Systems   Constitutional: Negative for chills and fever  HENT: Negative for sore throat and trouble swallowing  Eyes: Negative for visual disturbance  Respiratory: Negative for shortness of breath  Cardiovascular: Negative for chest pain and palpitations  Gastrointestinal: Negative for abdominal pain, constipation and diarrhea  Endocrine: Negative for cold intolerance, heat intolerance, polydipsia, polyphagia and polyuria  Genitourinary: Negative for frequency  Musculoskeletal: Negative for arthralgias and myalgias  Skin: Negative for rash  Neurological: Negative for dizziness and tremors  Hematological: Negative for adenopathy  Psychiatric/Behavioral: Negative for sleep disturbance  All other systems reviewed and are negative  Physical Exam:  Body mass index is 21 95 kg/m²  /60   Pulse 60   Wt 47 6 kg (105 lb)   BMI 21 95 kg/m²    Wt Readings from Last 3 Encounters:   02/27/20 47 6 kg (105 lb)   11/27/19 46 3 kg (102 lb)   10/28/19 45 4 kg (100 lb)       Physical Exam   Constitutional: She is oriented to person, place, and time  She appears well-developed and well-nourished  No distress  HENT:   Head: Normocephalic and atraumatic  Eyes: Pupils are equal, round, and reactive to light  Conjunctivae are normal    Neck: Normal range of motion  Neck supple  No thyromegaly present  Cardiovascular: Normal rate, regular rhythm and normal heart sounds  Pulmonary/Chest: Effort normal and breath sounds normal  No respiratory distress   She has no wheezes  She has no rales  Abdominal: Soft  Bowel sounds are normal  She exhibits no distension  There is no tenderness  Musculoskeletal: Normal range of motion  She exhibits no edema  Neurological: She is alert and oriented to person, place, and time  Skin: Skin is warm and dry  Psychiatric: She has a normal mood and affect  Vitals reviewed  Labs:     Lab Results   Component Value Date    HGBA1C 6 2 (H) 02/17/2020       Lab Results   Component Value Date    SODIUM 139 02/17/2020    K 4 3 02/17/2020     (H) 02/17/2020    CO2 24 02/17/2020    AGAP 6 02/17/2020    BUN 12 02/17/2020    CREATININE 0 61 02/17/2020    GLUC 117 03/22/2019    GLUF 118 (H) 02/17/2020    CALCIUM 8 8 02/17/2020    AST 10 02/17/2020    ALT 13 02/17/2020    ALKPHOS 61 02/17/2020    TP 7 2 02/17/2020    TBILI 0 34 02/17/2020    EGFR 127 02/17/2020         Lab Results   Component Value Date    HDL 62 02/17/2020    TRIG 25 02/17/2020       Lab Results   Component Value Date    [id number] 0 599 11/09/2019    [id number] 0 737 06/14/2018     Lab Results   Component Value Date    FREET4 0 97 11/09/2019       Impression & Plan:    Problem List Items Addressed This Visit        Endocrine    Type 1 diabetes mellitus without complication ([facility] [address] ) - Primary     At goal, however having frequent episodes of evening hyperglycemia  Add basal rate at 9 pm for 0 250  Upload pump in two weeks for review  Focus on dietary and lifestyle modifications  Relevant Orders    Hemoglobin A1C    Comprehensive metabolic panel    Lipid Panel with Direct LDL reflex    Microalbumin / creatinine urine ratio    T4, free    TSH, 3rd generation       Other    Insulin pump in place          Orders Placed This Encounter   Procedures    Hemoglobin A1C     Standing Status:   Future     Standing Expiration Date:   2/27/2021    Comprehensive metabolic panel     This is a patient instruction: Patient fasting for 8 hours or longer recommended  Standing Status:   Future     Standing Expiration Date:   2/27/2021    Lipid Panel with Direct LDL reflex     This is a patient instruction: This test requires patient fasting for 10-12 hours or longer  Drinking of black coffee or black tea is acceptable  Standing Status:   Future     Standing Expiration Date:   2/27/2021    Microalbumin / creatinine urine ratio     Standing Status:   Future     Standing Expiration Date:   2/27/2021    T4, free     Standing Status:   Future     Standing Expiration Date:   5/27/2020    TSH, 3rd generation     This is a patient instruction: This test is non-fasting  Please drink two glasses of water morning of bloodwork  Standing Status:   Future     Standing Expiration Date:   5/27/2020         Discussed with the patient and all questioned fully answered  She will call me if any problems arise  Follow-up appointment in 3 months       Counseled patient on diagnostic results, prognosis, risk and benefit of treatment options, instruction for management, importance of treatment compliance, Risk  factor reduction and impressions      Aileen Bobby [address]

## 2020-02-27 ENCOUNTER — OFFICE VISIT (OUTPATIENT)
Dept: ENDOCRINOLOGY | Facility: CLINIC | Age: 25
End: 2020-02-27
Payer: COMMERCIAL

## 2020-02-27 VITALS
HEART RATE: 60 BPM | SYSTOLIC BLOOD PRESSURE: 110 MMHG | DIASTOLIC BLOOD PRESSURE: 60 MMHG | BODY MASS INDEX: 21.95 KG/M2 | WEIGHT: 105 LBS

## 2020-02-27 DIAGNOSIS — E10.9 TYPE 1 DIABETES MELLITUS WITHOUT COMPLICATION (HCC): Primary | ICD-10-CM

## 2020-02-27 DIAGNOSIS — Z96.41 INSULIN PUMP IN PLACE: ICD-10-CM

## 2020-02-27 DIAGNOSIS — E10.9 TYPE 1 DIABETES MELLITUS WITHOUT COMPLICATION (HCC): ICD-10-CM

## 2020-02-27 PROCEDURE — 2022F DILAT RTA XM EVC RTNOPTHY: CPT | Performed by: NURSE PRACTITIONER

## 2020-02-27 PROCEDURE — 99215 OFFICE O/P EST HI 40 MIN: CPT | Performed by: NURSE PRACTITIONER

## 2020-02-27 PROCEDURE — 3044F HG A1C LEVEL LT 7.0%: CPT | Performed by: NURSE PRACTITIONER

## 2020-02-27 PROCEDURE — 1036F TOBACCO NON-USER: CPT | Performed by: NURSE PRACTITIONER

## 2020-02-27 RX ORDER — BLOOD SUGAR DIAGNOSTIC
STRIP MISCELLANEOUS
Qty: 900 EACH | Refills: 1 | Status: SHIPPED | OUTPATIENT
Start: 2020-02-27 | End: 2021-02-22

## 2020-02-28 NOTE — ASSESSMENT & PLAN NOTE
At goal, however having frequent episodes of evening hyperglycemia  Add basal rate at 9 pm for 0 250  Upload pump in two weeks for review  Focus on dietary and lifestyle modifications

## 2020-03-05 NOTE — TELEPHONE ENCOUNTER
Patient would like to switch birth control pill to a totally different brand   Please call after 330 pm or before 0900 am

## 2020-03-11 NOTE — TELEPHONE ENCOUNTER
Pt contacted # [phone] vm- per hippa communication consent on file - lmom advising as directed per notes- pt should come in for exam and discussion of other methods of OCP   I asked pt to call bk and schedule and appt

## 2020-04-13 ENCOUNTER — TELEMEDICINE (OUTPATIENT)
Dept: OBGYN CLINIC | Facility: MEDICAL CENTER | Age: 25
End: 2020-04-13
Payer: COMMERCIAL

## 2020-04-13 VITALS — BODY MASS INDEX: 22.04 KG/M2 | HEIGHT: 58 IN | WEIGHT: 105 LBS

## 2020-04-13 DIAGNOSIS — Z30.09 ENCOUNTER FOR COUNSELING REGARDING CONTRACEPTION: Primary | ICD-10-CM

## 2020-04-13 PROCEDURE — 3008F BODY MASS INDEX DOCD: CPT | Performed by: NURSE PRACTITIONER

## 2020-04-13 PROCEDURE — 99212 OFFICE O/P EST SF 10 MIN: CPT | Performed by: STUDENT IN AN ORGANIZED HEALTH CARE EDUCATION/TRAINING PROGRAM

## 2020-05-28 ENCOUNTER — APPOINTMENT (OUTPATIENT)
Dept: LAB | Age: 25
End: 2020-05-28
Payer: COMMERCIAL

## 2020-05-28 ENCOUNTER — TELEPHONE (OUTPATIENT)
Dept: ENDOCRINOLOGY | Facility: CLINIC | Age: 25
End: 2020-05-28

## 2020-05-28 ENCOUNTER — TRANSCRIBE ORDERS (OUTPATIENT)
Dept: ADMINISTRATIVE | Age: 25
End: 2020-05-28

## 2020-05-28 DIAGNOSIS — E10.9 TYPE 1 DIABETES MELLITUS WITHOUT COMPLICATION (HCC): ICD-10-CM

## 2020-05-28 DIAGNOSIS — E10.9 TYPE 1 DIABETES MELLITUS WITHOUT COMPLICATION (HCC): Primary | ICD-10-CM

## 2020-05-28 LAB
ALBUMIN SERPL BCP-MCNC: 4 G/DL (ref 3.5–5)
ALP SERPL-CCNC: 54 U/L (ref 46–116)
ALT SERPL W P-5'-P-CCNC: 13 U/L (ref 12–78)
ANION GAP SERPL CALCULATED.3IONS-SCNC: 5 MMOL/L (ref 4–13)
AST SERPL W P-5'-P-CCNC: 8 U/L (ref 5–45)
BILIRUB SERPL-MCNC: 0.57 MG/DL (ref 0.2–1)
BUN SERPL-MCNC: 10 MG/DL (ref 5–25)
CALCIUM SERPL-MCNC: 9.2 MG/DL (ref 8.3–10.1)
CHLORIDE SERPL-SCNC: 109 MMOL/L (ref 100–108)
CHOLEST SERPL-MCNC: 143 MG/DL (ref 50–200)
CO2 SERPL-SCNC: 25 MMOL/L (ref 21–32)
CREAT SERPL-MCNC: 0.65 MG/DL (ref 0.6–1.3)
CREAT UR-MCNC: 19.2 MG/DL
EST. AVERAGE GLUCOSE BLD GHB EST-MCNC: 128 MG/DL
GFR SERPL CREATININE-BSD FRML MDRD: 125 ML/MIN/1.73SQ M
GLUCOSE P FAST SERPL-MCNC: 109 MG/DL (ref 65–99)
HBA1C MFR BLD: 6.1 %
HDLC SERPL-MCNC: 61 MG/DL
LDLC SERPL CALC-MCNC: 76 MG/DL (ref 0–100)
MICROALBUMIN UR-MCNC: <5 MG/L (ref 0–20)
MICROALBUMIN/CREAT 24H UR: <26 MG/G CREATININE (ref 0–30)
POTASSIUM SERPL-SCNC: 3.8 MMOL/L (ref 3.5–5.3)
PROT SERPL-MCNC: 7.4 G/DL (ref 6.4–8.2)
SODIUM SERPL-SCNC: 139 MMOL/L (ref 136–145)
T4 FREE SERPL-MCNC: 1.08 NG/DL (ref 0.76–1.46)
TRIGL SERPL-MCNC: 31 MG/DL
TSH SERPL DL<=0.05 MIU/L-ACNC: 1.54 UIU/ML (ref 0.36–3.74)

## 2020-05-28 PROCEDURE — 84439 ASSAY OF FREE THYROXINE: CPT

## 2020-05-28 PROCEDURE — 82043 UR ALBUMIN QUANTITATIVE: CPT

## 2020-05-28 PROCEDURE — 80053 COMPREHEN METABOLIC PANEL: CPT

## 2020-05-28 PROCEDURE — 83036 HEMOGLOBIN GLYCOSYLATED A1C: CPT

## 2020-05-28 PROCEDURE — 84443 ASSAY THYROID STIM HORMONE: CPT

## 2020-05-28 PROCEDURE — 36415 COLL VENOUS BLD VENIPUNCTURE: CPT

## 2020-05-28 PROCEDURE — 80061 LIPID PANEL: CPT

## 2020-05-28 PROCEDURE — 82570 ASSAY OF URINE CREATININE: CPT

## 2020-06-01 ENCOUNTER — TELEPHONE (OUTPATIENT)
Dept: ENDOCRINOLOGY | Facility: CLINIC | Age: 25
End: 2020-06-01

## 2020-06-02 ENCOUNTER — TELEMEDICINE (OUTPATIENT)
Dept: ENDOCRINOLOGY | Facility: CLINIC | Age: 25
End: 2020-06-02

## 2020-06-02 DIAGNOSIS — Z96.41 INSULIN PUMP IN PLACE: ICD-10-CM

## 2020-06-02 DIAGNOSIS — E10.9 TYPE 1 DIABETES MELLITUS WITHOUT COMPLICATION (HCC): Primary | ICD-10-CM

## 2020-06-02 PROCEDURE — 99213 OFFICE O/P EST LOW 20 MIN: CPT | Performed by: NURSE PRACTITIONER

## 2020-07-15 ENCOUNTER — TELEPHONE (OUTPATIENT)
Dept: ENDOCRINOLOGY | Facility: CLINIC | Age: 25
End: 2020-07-15

## 2020-08-21 ENCOUNTER — TRANSCRIBE ORDERS (OUTPATIENT)
Dept: ADMINISTRATIVE | Age: 25
End: 2020-08-21

## 2020-08-29 ENCOUNTER — APPOINTMENT (OUTPATIENT)
Dept: LAB | Age: 25
End: 2020-08-29
Payer: COMMERCIAL

## 2020-09-02 ENCOUNTER — OFFICE VISIT (OUTPATIENT)
Dept: ENDOCRINOLOGY | Facility: CLINIC | Age: 25
End: 2020-09-02
Payer: COMMERCIAL

## 2020-09-02 VITALS
SYSTOLIC BLOOD PRESSURE: 118 MMHG | HEIGHT: 58 IN | WEIGHT: 99.6 LBS | HEART RATE: 64 BPM | BODY MASS INDEX: 20.91 KG/M2 | TEMPERATURE: 98 F | DIASTOLIC BLOOD PRESSURE: 72 MMHG

## 2020-09-02 DIAGNOSIS — E10.9 TYPE 1 DIABETES MELLITUS WITHOUT COMPLICATION (HCC): Primary | ICD-10-CM

## 2020-09-02 PROCEDURE — 1036F TOBACCO NON-USER: CPT | Performed by: NURSE PRACTITIONER

## 2020-09-02 PROCEDURE — 99214 OFFICE O/P EST MOD 30 MIN: CPT | Performed by: NURSE PRACTITIONER

## 2020-09-02 PROCEDURE — 95251 CONT GLUC MNTR ANALYSIS I&R: CPT | Performed by: NURSE PRACTITIONER

## 2020-09-02 RX ORDER — ADAPALENE 3 MG/G
1 GEL TOPICAL DAILY
COMMUNITY
Start: 2020-05-28 | End: 2022-01-12 | Stop reason: ALTCHOICE

## 2020-09-02 RX ORDER — MINOCYCLINE HYDROCHLORIDE 100 MG/1
100 CAPSULE ORAL DAILY
COMMUNITY
Start: 2020-08-06 | End: 2022-01-12 | Stop reason: ALTCHOICE

## 2020-09-02 NOTE — ASSESSMENT & PLAN NOTE
Very well controlled  No changes to pump made at this time  Continue with healthy diet and exercise  Repeat labs prior to next visit in 3 months    Lab Results   Component Value Date    HGBA1C 6 4 (H) 08/29/2020

## 2020-09-02 NOTE — PROGRESS NOTES
Problem List Items Addressed This Visit        Endocrine    Type 1 diabetes mellitus without complication ([facility] [address] ) - Primary     Very well controlled  No changes to pump made at this time  Continue with healthy diet and exercise  Repeat labs prior to next visit in 3 months  Lab Results   Component Value Date    HGBA1C 6 4 (H) 08/29/2020            Relevant Orders    Basic metabolic panel Lab Collect    HEMOGLOBIN A1C W/ EAG ESTIMATION Lab Collect    TSH, 3rd generation Lab Collect    T4, free Lab Collect    Microalbumin / creatinine urine ratio Lab Collect          Established Patient Progress Note       Chief Complaint   Patient presents with    Diabetes Type 1        History of Present Illness:     Herbie Olivas is a 25 y o  female with a history of type 1 diabetes with long term use of insulin since her diagnosis in June 2018  She is currently using a Medtronic 670 G pump with a continuous glucose monitor  She denies any polyuria, polydipsia, and nocturia  She had her eye exam 2 months ago  Negative for retinopathy  She denies any changes in her vision  Brief foot exam performed in the office today  Patient denies any numbness or tingling  She denies any episodes of hypoglycemia  She has just resumed a new schedule with the school year beginning  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  At this juncture, she denies any increased stress  She is still exercising  She is still eating healthfully  Current Insulin pump settings:  Basal rate:12am 0 575, 0630 0 250, noon 0 225, 9pm 0 275  Insulin to carb ratio:12am 10, 0700 9 7, 3pm 9 9  Insulin sensitivity factor: 60  BG target: 120  Active Insulin time: 4 hours    Type of insulin:  Humalog    Backup Plan: Patient is aware that in case of malfunctioning of the pump or unexplained hyperglycemia to use basal and bolus therapy as backup which is prescribed to the patient   Also notified patient to call clinic and/or pump company if any issues or go to emergency department if signs/symptoms of DKA  Merari Corbin   Device used Medtronic 670 G  Home use     Indication   Type 1 Diabetes    More than 72 hours of data was reviewed  Report to be scanned to chart  Date Range: 8/20/2020-9/2/2020    Analysis of data:   Average Glucose:  124   SD :  30   Time in Target Range: 94 %   Time Above Range:  5%   Time Below Range:  Less than 1%     Interpretation of data:  Patient is very well controlled at this time  She will occasionally have brief postprandial spikes after breakfast and lunch  These are quickly corrected without a rapid decline in to hypoglycemia  Her total daily dose of insulin is fairly consistently divided between 60% bolus and 40% basal insulin            Patient Active Problem List   Diagnosis    Breakthrough bleeding    Classic migraine with aura    Migraine headache    Tension type headache    Type 1 diabetes mellitus without complication (HCC)    Insulin pump in place    Environmental and seasonal allergies    Encounter for gynecological examination    Encounter for counseling regarding contraception      Past Medical History:   Diagnosis Date    Diabetes mellitus ([address] )     type 1    Migraine       Past Surgical History:   Procedure Laterality Date    MOUTH SURGERY  2012    TOOTH EXTRACTION      WISDOM TOOTH EXTRACTION        Family History   Problem Relation Age of Onset    Hypertension Mother     Cancer Maternal Grandmother     Hypertension Maternal Grandmother     Cancer Maternal Grandfather     Diabetes Maternal Grandfather     Heart disease Maternal Grandfather     Hypertension Maternal Grandfather     Diabetes Paternal Grandmother     Heart disease Paternal Grandmother     Hypertension Paternal Grandmother     Arthritis Other     Diabetes Other     Arthritis Other     Diabetes Other     No Known Problems Father      Social History     Tobacco Use    Smoking status: Never Smoker    Smokeless tobacco: Never Used   Substance Use Topics    Alcohol use: No     Comment: rarely     No Known Allergies    Current Outpatient Medications:     acetone, urine, test strip, 100 strips by Does not apply route as needed for high blood sugar, Disp: 100 each, Rfl: 3    Adapalene 0 3 % gel, Apply 1 application topically daily (face), Disp: , Rfl:     BAQSIMI TWO PACK 3 MG/DOSE POWD, 3 mg into each nostril as needed (hypoglycemic emergency) Baqsimi, brand necessary, Disp: 2 each, Rfl: 1    LEOPOLDO 0 35 MG tablet, Take 1 tablet (0 35 mg total) by mouth daily, Disp: 28 tablet, Rfl: 8    CONTOUR NEXT TEST test strip, TEST 8-10 TIMES A DAY AS INSTRUCTED, Disp: 900 each, Rfl: 1    desloratadine (CLARINEX) 5 MG tablet, Take 1 tablet (5 mg total) by mouth daily (Patient taking differently: Take 5 mg by mouth daily as needed ), Disp: 90 tablet, Rfl: 3    insulin lispro (HumaLOG) 100 units/mL injection pen, Use in case of pump failure, Disp: 5 pen, Rfl: 3    insulin lispro (HumaLOG) 100 units/mL injection, Use up to 50 units per day via pump, Disp: 50 mL, Rfl: 3    Insulin Syringes, Disposable, U-100 1 ML MISC, Using syringe daily to fill insulin cartridge as directed, Disp: 30 each, Rfl: 6    minocycline (MINOCIN) 100 mg capsule, Take 100 mg by mouth 2 (two) times a day, Disp: , Rfl:     Review of Systems   Constitutional: Negative for activity change, appetite change, fatigue and unexpected weight change  Eyes: Negative for visual disturbance  Respiratory: Negative for cough, chest tightness and shortness of breath  Cardiovascular: Negative for chest pain, palpitations and leg swelling  Gastrointestinal: Negative for constipation, diarrhea, nausea and vomiting  Endocrine: Negative for polydipsia, polyphagia and polyuria  Genitourinary: Negative for frequency  Skin: Negative for wound  Neurological: Negative for dizziness, weakness, light-headedness, numbness and headaches  Psychiatric/Behavioral: Negative for decreased concentration, dysphoric mood and sleep disturbance  The patient is not nervous/anxious  Physical Exam:  Body mass index is 20 82 kg/m²  /72 (BP Location: Left arm, Patient Position: Sitting, Cuff Size: Standard)   Pulse 64   Temp 98 °F (36 7 °C) (Temporal)   Ht 4' 10" (1 473 m)   Wt 45 2 kg (99 lb 9 6 oz)   BMI 20 82 kg/m²    Wt Readings from Last 3 Encounters:   09/02/20 45 2 kg (99 lb 9 6 oz)   04/13/20 47 6 kg (105 lb)   02/27/20 47 6 kg (105 lb)       Physical Exam  Constitutional:       Appearance: She is well-developed and normal weight  HENT:      Head: Normocephalic and atraumatic  Eyes:      Extraocular Movements: Extraocular movements intact  Conjunctiva/sclera: Conjunctivae normal       Pupils: Pupils are equal, round, and reactive to light  Neck:      Musculoskeletal: Normal range of motion and neck supple  Thyroid: No thyromegaly  Cardiovascular:      Rate and Rhythm: Normal rate and regular rhythm  Pulses: Normal pulses  no weak pulses          Dorsalis pedis pulses are 2+ on the right side and 2+ on the left side  Posterior tibial pulses are 2+ on the right side and 2+ on the left side  Heart sounds: Normal heart sounds  Pulmonary:      Effort: Pulmonary effort is normal       Breath sounds: Normal breath sounds  Abdominal:      General: Bowel sounds are normal  There is no distension  Palpations: Abdomen is soft  Tenderness: There is no abdominal tenderness  Musculoskeletal:      Right lower leg: No edema  Left lower leg: No edema  Feet:      Right foot:      Skin integrity: No ulcer, skin breakdown, erythema, warmth, callus or dry skin  Left foot:      Skin integrity: No ulcer, skin breakdown, erythema, warmth, callus or dry skin  Lymphadenopathy:      Cervical: No cervical adenopathy  Skin:     General: Skin is warm and dry        Capillary Refill: Capillary refill takes less than 2 seconds  Neurological:      Mental Status: She is alert and oriented to person, place, and time  Patient's shoes and socks removed  Right Foot/Ankle   Right Foot Inspection  Skin Exam: skin normal and skin intact no dry skin, no warmth, no callus, no erythema, no maceration, no abnormal color, no pre-ulcer, no ulcer and no callus                          Toe Exam: ROM and strength within normal limits  Sensory   Vibration: intact  Proprioception: intact   Monofilament testing: intact  Vascular  Capillary refills: < 3 seconds  The right DP pulse is 2+  The right PT pulse is 2+  Left Foot/Ankle  Left Foot Inspection  Skin Exam: skin normal and skin intactno dry skin, no warmth, no erythema, no maceration, normal color, no pre-ulcer, no ulcer and no callus                         Toe Exam: ROM and strength within normal limits                   Sensory   Vibration: intact  Proprioception: intact  Monofilament: intact  Vascular  Capillary refills: < 3 seconds  The left DP pulse is 2+  The left PT pulse is 2+  Assign Risk Category:  No deformity present; No loss of protective sensation; No weak pulses       Risk: 0        Labs:       Lab Results   Component Value Date    CREATININE 0 73 08/29/2020    CREATININE 0 65 05/28/2020    CREATININE 0 61 02/17/2020    BUN 10 08/29/2020    K 3 8 08/29/2020     08/29/2020    CO2 26 08/29/2020     eGFR   Date Value Ref Range Status   08/29/2020 116 ml/min/1 73sq m Final       Lab Results   Component Value Date    HDL 66 08/29/2020    TRIG 39 08/29/2020       Lab Results   Component Value Date    ALT 15 08/29/2020    AST 10 08/29/2020    ALKPHOS 54 08/29/2020       Lab Results   Component Value Date    FREET4 1 08 05/28/2020         Impression & Plan:    Problem List Items Addressed This Visit        Endocrine    Type 1 diabetes mellitus without complication ([facility] [address] ) - Primary     Very well controlled  No changes to pump made at this time    Continue with healthy diet and exercise  Repeat labs prior to next visit in 3 months  Lab Results   Component Value Date    HGBA1C 6 4 (H) 08/29/2020            Relevant Orders    Basic metabolic panel Lab Collect    HEMOGLOBIN A1C W/ EAG ESTIMATION Lab Collect    TSH, 3rd generation Lab Collect    T4, free Lab Collect    Microalbumin / creatinine urine ratio Lab Collect          Discussed with the patient and all questioned fully answered  She will call me if any problems arise  Follow-up appointment in 3 months       Counseled patient on diagnostic results, prognosis, risk and benefit of treatment options, instruction for management, importance of treatment compliance, Risk  factor reduction and impressions      SANTANA Trejo

## 2020-09-15 DIAGNOSIS — Z30.41 ENCOUNTER FOR SURVEILLANCE OF CONTRACEPTIVE PILLS: ICD-10-CM

## 2020-09-15 RX ORDER — ACETAMINOPHEN AND CODEINE PHOSPHATE 120; 12 MG/5ML; MG/5ML
SOLUTION ORAL
Qty: 84 TABLET | Refills: 3 | Status: SHIPPED | OUTPATIENT
Start: 2020-09-15 | End: 2021-11-16

## 2020-09-27 DIAGNOSIS — J30.89 ENVIRONMENTAL AND SEASONAL ALLERGIES: ICD-10-CM

## 2020-09-29 DIAGNOSIS — E10.9 TYPE 1 DIABETES MELLITUS WITHOUT COMPLICATION (HCC): ICD-10-CM

## 2020-09-29 RX ORDER — DESLORATADINE 5 MG/1
5 TABLET ORAL DAILY PRN
Qty: 90 TABLET | Refills: 1 | Status: SHIPPED | OUTPATIENT
Start: 2020-09-29 | End: 2021-04-02

## 2020-10-05 ENCOUNTER — TELEPHONE (OUTPATIENT)
Dept: DIABETES SERVICES | Facility: CLINIC | Age: 25
End: 2020-10-05

## 2020-10-06 ENCOUNTER — TELEPHONE (OUTPATIENT)
Dept: ENDOCRINOLOGY | Facility: CLINIC | Age: 25
End: 2020-10-06

## 2020-11-09 ENCOUNTER — ANNUAL EXAM (OUTPATIENT)
Dept: OBGYN CLINIC | Facility: MEDICAL CENTER | Age: 25
End: 2020-11-09
Payer: COMMERCIAL

## 2020-11-09 VITALS
DIASTOLIC BLOOD PRESSURE: 68 MMHG | BODY MASS INDEX: 21.41 KG/M2 | TEMPERATURE: 98.8 F | SYSTOLIC BLOOD PRESSURE: 110 MMHG | WEIGHT: 102 LBS | HEIGHT: 58 IN

## 2020-11-09 DIAGNOSIS — Z01.419 ENCOUNTER FOR GYNECOLOGICAL EXAMINATION: Primary | ICD-10-CM

## 2020-11-09 PROCEDURE — 99395 PREV VISIT EST AGE 18-39: CPT | Performed by: STUDENT IN AN ORGANIZED HEALTH CARE EDUCATION/TRAINING PROGRAM

## 2020-11-09 PROCEDURE — 3008F BODY MASS INDEX DOCD: CPT | Performed by: STUDENT IN AN ORGANIZED HEALTH CARE EDUCATION/TRAINING PROGRAM

## 2020-11-09 PROCEDURE — 1036F TOBACCO NON-USER: CPT | Performed by: STUDENT IN AN ORGANIZED HEALTH CARE EDUCATION/TRAINING PROGRAM

## 2020-12-11 ENCOUNTER — LAB (OUTPATIENT)
Dept: LAB | Age: 25
End: 2020-12-11
Payer: COMMERCIAL

## 2020-12-11 DIAGNOSIS — E10.9 TYPE 1 DIABETES MELLITUS WITHOUT COMPLICATION (HCC): ICD-10-CM

## 2020-12-11 LAB
ANION GAP SERPL CALCULATED.3IONS-SCNC: 3 MMOL/L (ref 4–13)
BUN SERPL-MCNC: 14 MG/DL (ref 5–25)
CALCIUM SERPL-MCNC: 9.5 MG/DL (ref 8.3–10.1)
CHLORIDE SERPL-SCNC: 107 MMOL/L (ref 100–108)
CO2 SERPL-SCNC: 28 MMOL/L (ref 21–32)
CREAT SERPL-MCNC: 0.72 MG/DL (ref 0.6–1.3)
CREAT UR-MCNC: 18.5 MG/DL
EST. AVERAGE GLUCOSE BLD GHB EST-MCNC: 140 MG/DL
GFR SERPL CREATININE-BSD FRML MDRD: 117 ML/MIN/1.73SQ M
GLUCOSE P FAST SERPL-MCNC: 116 MG/DL (ref 65–99)
HBA1C MFR BLD: 6.5 %
MICROALBUMIN UR-MCNC: <5 MG/L (ref 0–20)
MICROALBUMIN/CREAT 24H UR: <27 MG/G CREATININE (ref 0–30)
POTASSIUM SERPL-SCNC: 3.9 MMOL/L (ref 3.5–5.3)
SODIUM SERPL-SCNC: 138 MMOL/L (ref 136–145)
T4 FREE SERPL-MCNC: 1.03 NG/DL (ref 0.76–1.46)
TSH SERPL DL<=0.05 MIU/L-ACNC: 2.41 UIU/ML (ref 0.36–3.74)

## 2020-12-11 PROCEDURE — 82043 UR ALBUMIN QUANTITATIVE: CPT

## 2020-12-11 PROCEDURE — 80048 BASIC METABOLIC PNL TOTAL CA: CPT

## 2020-12-11 PROCEDURE — 36415 COLL VENOUS BLD VENIPUNCTURE: CPT

## 2020-12-11 PROCEDURE — 84443 ASSAY THYROID STIM HORMONE: CPT

## 2020-12-11 PROCEDURE — 83036 HEMOGLOBIN GLYCOSYLATED A1C: CPT

## 2020-12-11 PROCEDURE — 3044F HG A1C LEVEL LT 7.0%: CPT | Performed by: NURSE PRACTITIONER

## 2020-12-11 PROCEDURE — 84439 ASSAY OF FREE THYROXINE: CPT

## 2020-12-11 PROCEDURE — 82570 ASSAY OF URINE CREATININE: CPT

## 2020-12-14 ENCOUNTER — CLINICAL SUPPORT (OUTPATIENT)
Dept: OBGYN CLINIC | Facility: MEDICAL CENTER | Age: 25
End: 2020-12-14
Payer: COMMERCIAL

## 2020-12-14 VITALS — SYSTOLIC BLOOD PRESSURE: 124 MMHG | WEIGHT: 100.4 LBS | DIASTOLIC BLOOD PRESSURE: 84 MMHG | BODY MASS INDEX: 20.98 KG/M2

## 2020-12-14 DIAGNOSIS — Z23 NEED FOR HPV VACCINATION: Primary | ICD-10-CM

## 2020-12-14 PROCEDURE — 90471 IMMUNIZATION ADMIN: CPT

## 2020-12-14 PROCEDURE — 90651 9VHPV VACCINE 2/3 DOSE IM: CPT

## 2020-12-16 ENCOUNTER — TELEMEDICINE (OUTPATIENT)
Dept: ENDOCRINOLOGY | Facility: CLINIC | Age: 25
End: 2020-12-16
Payer: COMMERCIAL

## 2020-12-16 DIAGNOSIS — Z96.41 INSULIN PUMP IN PLACE: ICD-10-CM

## 2020-12-16 DIAGNOSIS — E10.9 TYPE 1 DIABETES MELLITUS WITHOUT COMPLICATION (HCC): Primary | ICD-10-CM

## 2020-12-16 PROCEDURE — 1036F TOBACCO NON-USER: CPT | Performed by: NURSE PRACTITIONER

## 2020-12-16 PROCEDURE — 99214 OFFICE O/P EST MOD 30 MIN: CPT | Performed by: NURSE PRACTITIONER

## 2021-02-21 DIAGNOSIS — E10.9 TYPE 1 DIABETES MELLITUS WITHOUT COMPLICATION (HCC): ICD-10-CM

## 2021-02-22 RX ORDER — BLOOD SUGAR DIAGNOSTIC
STRIP MISCELLANEOUS
Qty: 900 EACH | Refills: 1 | Status: SHIPPED | OUTPATIENT
Start: 2021-02-22 | End: 2021-09-22 | Stop reason: SDUPTHER

## 2021-03-01 ENCOUNTER — TELEPHONE (OUTPATIENT)
Dept: OBGYN CLINIC | Facility: MEDICAL CENTER | Age: 26
End: 2021-03-01

## 2021-03-19 ENCOUNTER — CLINICAL SUPPORT (OUTPATIENT)
Dept: OBGYN CLINIC | Facility: MEDICAL CENTER | Age: 26
End: 2021-03-19
Payer: COMMERCIAL

## 2021-03-19 VITALS — SYSTOLIC BLOOD PRESSURE: 120 MMHG | BODY MASS INDEX: 21.15 KG/M2 | DIASTOLIC BLOOD PRESSURE: 78 MMHG | WEIGHT: 101.2 LBS

## 2021-03-19 DIAGNOSIS — Z23 NEED FOR HPV VACCINATION: Primary | ICD-10-CM

## 2021-03-19 PROCEDURE — 90651 9VHPV VACCINE 2/3 DOSE IM: CPT | Performed by: NURSE PRACTITIONER

## 2021-03-19 PROCEDURE — 90471 IMMUNIZATION ADMIN: CPT | Performed by: NURSE PRACTITIONER

## 2021-03-19 RX ORDER — SPIRONOLACTONE 50 MG/1
50 TABLET, FILM COATED ORAL 2 TIMES DAILY
COMMUNITY
Start: 2021-02-22 | End: 2022-01-12 | Stop reason: ALTCHOICE

## 2021-03-19 NOTE — PROGRESS NOTES
Last Gardasil:12/14/20  Inj Site: Right Deltoid  Side Effects if any: Pt  Tolerated well  Gardasil Given By: Alexander Quintero MA  Next appointment due 4 mo   From today  Lot:  Exp:  NDC:  Manufacture:

## 2021-03-31 DIAGNOSIS — Z23 ENCOUNTER FOR IMMUNIZATION: ICD-10-CM

## 2021-04-01 DIAGNOSIS — J30.89 ENVIRONMENTAL AND SEASONAL ALLERGIES: ICD-10-CM

## 2021-04-02 RX ORDER — DESLORATADINE 5 MG/1
5 TABLET ORAL DAILY PRN
Qty: 90 TABLET | Refills: 1 | Status: SHIPPED | OUTPATIENT
Start: 2021-04-02 | End: 2022-01-12 | Stop reason: ALTCHOICE

## 2021-04-08 ENCOUNTER — OFFICE VISIT (OUTPATIENT)
Dept: FAMILY MEDICINE CLINIC | Facility: MEDICAL CENTER | Age: 26
End: 2021-04-08
Payer: COMMERCIAL

## 2021-04-08 VITALS
RESPIRATION RATE: 16 BRPM | WEIGHT: 99.4 LBS | SYSTOLIC BLOOD PRESSURE: 110 MMHG | TEMPERATURE: 99.4 F | HEIGHT: 59 IN | DIASTOLIC BLOOD PRESSURE: 70 MMHG | HEART RATE: 96 BPM | BODY MASS INDEX: 20.04 KG/M2

## 2021-04-08 DIAGNOSIS — E10.9 TYPE 1 DIABETES MELLITUS WITHOUT COMPLICATION (HCC): ICD-10-CM

## 2021-04-08 DIAGNOSIS — Z00.00 ROUTINE ADULT HEALTH MAINTENANCE: Primary | ICD-10-CM

## 2021-04-08 PROCEDURE — 3008F BODY MASS INDEX DOCD: CPT | Performed by: FAMILY MEDICINE

## 2021-04-08 PROCEDURE — 99395 PREV VISIT EST AGE 18-39: CPT | Performed by: FAMILY MEDICINE

## 2021-04-08 PROCEDURE — 3725F SCREEN DEPRESSION PERFORMED: CPT | Performed by: FAMILY MEDICINE

## 2021-04-08 NOTE — PROGRESS NOTES
Ed Shannon is here for CPX  SH: Working as  at [employer]  Living at home  Engaged   FH: GP's heart disease  No cancer  HH Caffeine 1 coffee daily  Alcohol none  Exercise walks daily for 1/2 hour  Dietary calcium  several daily  Vegan diet      She has insulin-dependent diabetes and sees endocrinology  She has an insulin pump  Her last A1c was 6 5  She uses adapalene for her facial acne  Uses Minocin for her back acne      She gets  occ abdominal pain mid abdomen  Every 3 months   Lasts a few hours   No associated GI symptoms  Associated with vaginal bleeding  She got The COVID vaccine  O: /70 (Cuff Size: Standard)   Pulse 96   Temp 99 4 °F (37 4 °C)   Resp 16   Ht 4' 10 5" (1 486 m)   Wt 45 1 kg (99 lb 6 4 oz)   BMI 20 42 kg/m²    Weight stable  Physical Exam  Constitutional:       Appearance: She is well-developed  HENT:      Head: Normocephalic  Right Ear: Tympanic membrane and external ear normal       Left Ear: Tympanic membrane and external ear normal       Nose: Nose normal    Eyes:      General: No scleral icterus  Conjunctiva/sclera: Conjunctivae normal       Pupils: Pupils are equal, round, and reactive to light  Neck:      Musculoskeletal: Normal range of motion and neck supple  Thyroid: No thyroid mass or thyromegaly  Vascular: No carotid bruit  Cardiovascular:      Rate and Rhythm: Normal rate and regular rhythm  Pulses:           Femoral pulses are 2+ on the right side and 2+ on the left side  Popliteal pulses are 2+ on the right side and 2+ on the left side  Dorsalis pedis pulses are 2+ on the right side and 2+ on the left side  Posterior tibial pulses are 2+ on the right side and 2+ on the left side  Heart sounds: Normal heart sounds  Pulmonary:      Effort: Pulmonary effort is normal  No respiratory distress  Breath sounds: Normal breath sounds  No wheezing or rales     Abdominal:      General: Bowel sounds are normal  There is no distension  Palpations: Abdomen is soft  There is no mass  Tenderness: There is no abdominal tenderness  Musculoskeletal: Normal range of motion  Lymphadenopathy:      Head:      Right side of head: No submandibular or occipital adenopathy  Left side of head: No submandibular or occipital adenopathy  Cervical: No cervical adenopathy  Upper Body:      Right upper body: No supraclavicular adenopathy  Left upper body: No supraclavicular adenopathy  Skin:     Findings: No lesion or rash  Neurological:      Mental Status: She is oriented to person, place, and time  Psychiatric:         Behavior: Behavior normal      Assessment   1  Health maintenance - up-to-date with COVID vaccination  Advised Tdap vaccination  She will check with endocrinology regarding pneumonia shots   and had routine gyn exam    discussed appropriate dietary calcium, diet and exercise  2  IDDM- controlled    Plan  As above

## 2021-05-07 ENCOUNTER — APPOINTMENT (OUTPATIENT)
Dept: LAB | Age: 26
End: 2021-05-07
Payer: COMMERCIAL

## 2021-05-07 DIAGNOSIS — E10.9 TYPE 1 DIABETES MELLITUS WITHOUT COMPLICATION (HCC): ICD-10-CM

## 2021-05-07 LAB
ALBUMIN SERPL BCP-MCNC: 4.1 G/DL (ref 3.5–5)
ALP SERPL-CCNC: 59 U/L (ref 46–116)
ALT SERPL W P-5'-P-CCNC: 18 U/L (ref 12–78)
ANION GAP SERPL CALCULATED.3IONS-SCNC: 4 MMOL/L (ref 4–13)
AST SERPL W P-5'-P-CCNC: 13 U/L (ref 5–45)
BILIRUB SERPL-MCNC: 0.49 MG/DL (ref 0.2–1)
BUN SERPL-MCNC: 10 MG/DL (ref 5–25)
CALCIUM SERPL-MCNC: 9.1 MG/DL (ref 8.3–10.1)
CHLORIDE SERPL-SCNC: 105 MMOL/L (ref 100–108)
CHOLEST SERPL-MCNC: 133 MG/DL (ref 50–200)
CO2 SERPL-SCNC: 27 MMOL/L (ref 21–32)
CREAT SERPL-MCNC: 0.66 MG/DL (ref 0.6–1.3)
EST. AVERAGE GLUCOSE BLD GHB EST-MCNC: 137 MG/DL
GFR SERPL CREATININE-BSD FRML MDRD: 123 ML/MIN/1.73SQ M
GLUCOSE P FAST SERPL-MCNC: 157 MG/DL (ref 65–99)
HBA1C MFR BLD: 6.4 %
HDLC SERPL-MCNC: 67 MG/DL
LDLC SERPL CALC-MCNC: 60 MG/DL (ref 0–100)
POTASSIUM SERPL-SCNC: 3.4 MMOL/L (ref 3.5–5.3)
PROT SERPL-MCNC: 7.5 G/DL (ref 6.4–8.2)
SODIUM SERPL-SCNC: 136 MMOL/L (ref 136–145)
TRIGL SERPL-MCNC: 29 MG/DL

## 2021-05-07 PROCEDURE — 3044F HG A1C LEVEL LT 7.0%: CPT | Performed by: NURSE PRACTITIONER

## 2021-05-07 PROCEDURE — 36415 COLL VENOUS BLD VENIPUNCTURE: CPT

## 2021-05-07 PROCEDURE — 80061 LIPID PANEL: CPT

## 2021-05-07 PROCEDURE — 83036 HEMOGLOBIN GLYCOSYLATED A1C: CPT

## 2021-05-07 PROCEDURE — 80053 COMPREHEN METABOLIC PANEL: CPT

## 2021-05-12 ENCOUNTER — OFFICE VISIT (OUTPATIENT)
Dept: ENDOCRINOLOGY | Facility: CLINIC | Age: 26
End: 2021-05-12
Payer: COMMERCIAL

## 2021-05-12 VITALS
BODY MASS INDEX: 21.32 KG/M2 | WEIGHT: 101.6 LBS | HEIGHT: 58 IN | SYSTOLIC BLOOD PRESSURE: 100 MMHG | DIASTOLIC BLOOD PRESSURE: 84 MMHG | HEART RATE: 88 BPM

## 2021-05-12 DIAGNOSIS — E10.9 TYPE 1 DIABETES MELLITUS WITHOUT COMPLICATION (HCC): Primary | ICD-10-CM

## 2021-05-12 DIAGNOSIS — Z96.41 INSULIN PUMP IN PLACE: ICD-10-CM

## 2021-05-12 PROCEDURE — 99215 OFFICE O/P EST HI 40 MIN: CPT | Performed by: NURSE PRACTITIONER

## 2021-05-12 PROCEDURE — 1036F TOBACCO NON-USER: CPT | Performed by: NURSE PRACTITIONER

## 2021-05-12 PROCEDURE — 95251 CONT GLUC MNTR ANALYSIS I&R: CPT | Performed by: NURSE PRACTITIONER

## 2021-05-12 PROCEDURE — 3008F BODY MASS INDEX DOCD: CPT | Performed by: NURSE PRACTITIONER

## 2021-05-12 NOTE — ASSESSMENT & PLAN NOTE
At goal however having consistent morning fasting in the 160s, occasional spikes with meals, and evening hyperglycemia  Frequent suspends by sensor likely causing morning hyperglycemia especially after using correction before bed  Will make following changes to pump settings  Basal rate 8 pm 0 375, correction factor 62, carb ratio 10  Referral given for diabetes education to review automode now that her total basal is over 8 units  Provided with My[employer]ve[employer] information to discuss sensor issues   Upload pump sensor/report in 1-2 weeks for review

## 2021-05-12 NOTE — PROGRESS NOTES
Established Patient Progress Note      Chief Complaint   Patient presents with    Diabetes Type 1          History of Present Illness:   Denita Severs is a 22 y o  female with a history of type 1 diabetes with long term use of insulin since June 2018  Reports no complications of diabetes  Last A1C 6 4  She is currently using Medtronic 670G  She reports frequent sensor issues and suspend by sensor  She does not feel sensor is accurate  Is currently 100% in manual mode  Review of pump/sensor report shows average BG of 160 with time in range 70%, low 1%, and high 29%  She often has morning fasting into the 160s  Is 200s consistently before bed and will use high correction  Denies recent illness or hospitalizations  Denies recent severe hypoglycemic or severe hyperglycemic episodes  Denies any issues with her current regimen  Home glucose monitoring: are performed regularly    Patient is on a Medtronic 670G pump prescribed by endocrinologist  She has been on a pump since 10/29/18    She denies any malfunctioning of the pump        Current Insulin pump settings:  Basal rate:12 am 0 675, 6:30 am 0 300, 12 pm 0 325, 9 pm 0 350  Insulin to carb ratio:12 am 9 6, 7 am 9 5, 3 pm 9 5  Insulin sensitivity factor: 60  BG target: 120  Active Insulin time: 4 hours     Type of insulin:  Humalog     Backup Plan: patient aware that in case of malfunctioning of the pump or unexplained hyperglycemia to use basal and bolus therapy as backup which is prescribed to the patient  Also notified patient to call clinic and/or pump company if any issues or go to emergency department if signs/symptoms of DKA         noncompliant some of the timedenies any side effects from current medications     Hypoglycemic episodes: Yes infrequent   H/o of hypoglycemia causing hospitalization or Intervention such as glucagon injection or ambulance call No   Hypoglycemia symptoms: dizziness, headache and sweating   Treatment of hypoglycemia: orange juice Last Eye Exam: [facility], Dr Clarisa Huitron Exam: 9/02/20       Patient Active Problem List   Diagnosis    Breakthrough bleeding    Classic migraine with aura    Migraine headache    Tension type headache    Type 1 diabetes mellitus without complication ([email or web address] )    Insulin pump in place    Environmental and seasonal allergies    Encounter for gynecological examination    Encounter for counseling regarding contraception      Past Medical History:   Diagnosis Date    Diabetes mellitus ([email or web address] 75 )     type 1    Migraine       Past Surgical History:   Procedure Laterality Date    MOUTH SURGERY  2012    TOOTH EXTRACTION      WISDOM TOOTH EXTRACTION        Family History   Problem Relation Age of Onset    Hypertension Mother     Cancer Maternal Grandmother     Hypertension Maternal Grandmother     Cancer Maternal Grandfather     Diabetes Maternal Grandfather     Heart disease Maternal Grandfather     Hypertension Maternal Grandfather     Diabetes Paternal Grandmother     Heart disease Paternal Grandmother     Hypertension Paternal Grandmother     Arthritis Other     Diabetes Other     Arthritis Other     Diabetes Other     No Known Problems Father      Social History     Tobacco Use    Smoking status: Never Smoker    Smokeless tobacco: Never Used   Substance Use Topics    Alcohol use: Never     Frequency: Never     Comment: rarely     No Known Allergies      Current Outpatient Medications:     acetone, urine, test strip, 100 strips by Does not apply route as needed for high blood sugar, Disp: 100 each, Rfl: 3    Adapalene 0 3 % gel, Apply 1 application topically daily (face), Disp: , Rfl:     BAQSIMI TWO PACK 3 MG/DOSE POWD, 3 mg into each nostril as needed (hypoglycemic emergency) Baqsimi, brand necessary, Disp: 2 each, Rfl: 1    Contour Next Test test strip, TEST 8-10 TIMES A DAY AS INSTRUCTED, Disp: 900 each, Rfl: 1    desloratadine (CLARINEX) 5 MG tablet, TAKE 1 TABLET (5 MG TOTAL) BY MOUTH DAILY AS NEEDED (CONGESTION), Disp: 90 tablet, Rfl: 1    insulin lispro (HumaLOG) 100 units/mL injection pen, Use in case of pump failure, Disp: 5 pen, Rfl: 3    insulin lispro (HumaLOG) 100 units/mL injection, Use up to 50 units per day via pump, Disp: 50 mL, Rfl: 3    Insulin Syringes, Disposable, U-100 1 ML MISC, Using syringe daily to fill insulin cartridge as directed, Disp: 30 each, Rfl: 6    norethindrone (MICRONOR) 0 35 MG tablet, TAKE 1 TABLET BY MOUTH EVERY DAY, Disp: 84 tablet, Rfl: 3    spironolactone (ALDACTONE) 50 mg tablet, Take 50 mg by mouth 2 (two) times a day, Disp: , Rfl:     minocycline (MINOCIN) 100 mg capsule, Take 100 mg by mouth daily , Disp: , Rfl:     Review of Systems   Constitutional: Negative for chills and fever  HENT: Negative for sore throat and trouble swallowing  Eyes: Negative for visual disturbance  Respiratory: Negative for shortness of breath  Cardiovascular: Negative for chest pain and palpitations  Gastrointestinal: Negative for abdominal pain, constipation and diarrhea  Endocrine: Negative for cold intolerance, heat intolerance, polydipsia, polyphagia and polyuria  Genitourinary: Negative for frequency  Musculoskeletal: Negative for arthralgias and myalgias  Skin: Negative for rash  Neurological: Negative for dizziness and tremors  Hematological: Negative for adenopathy  Psychiatric/Behavioral: Negative for sleep disturbance  All other systems reviewed and are negative  Physical Exam:  Body mass index is 21 23 kg/m²  /84   Pulse 88   Ht 4' 10" (1 473 m)   Wt 46 1 kg (101 lb 9 6 oz)   BMI 21 23 kg/m²    Wt Readings from Last 3 Encounters:   05/12/21 46 1 kg (101 lb 9 6 oz)   04/08/21 45 1 kg (99 lb 6 4 oz)   03/19/21 45 9 kg (101 lb 3 2 oz)       Physical Exam  Vitals signs reviewed  Constitutional:       General: She is not in acute distress  Appearance: She is well-developed     HENT:      Head: Normocephalic and atraumatic  Eyes:      Conjunctiva/sclera: Conjunctivae normal       Pupils: Pupils are equal, round, and reactive to light  Neck:      Musculoskeletal: Normal range of motion and neck supple  Thyroid: No thyromegaly  Cardiovascular:      Rate and Rhythm: Normal rate and regular rhythm  Heart sounds: Normal heart sounds  Pulmonary:      Effort: Pulmonary effort is normal  No respiratory distress  Breath sounds: Normal breath sounds  No wheezing or rales  Abdominal:      General: Bowel sounds are normal  There is no distension  Palpations: Abdomen is soft  Tenderness: There is no abdominal tenderness  Musculoskeletal: Normal range of motion  Skin:     General: Skin is warm and dry  Neurological:      Mental Status: She is alert and oriented to person, place, and time  Labs:     Lab Results   Component Value Date    HGBA1C 6 4 (H) 05/07/2021       Lab Results   Component Value Date    SODIUM 136 05/07/2021    K 3 4 (L) 05/07/2021     05/07/2021    CO2 27 05/07/2021    AGAP 4 05/07/2021    BUN 10 05/07/2021    CREATININE 0 66 05/07/2021    GLUC 117 03/22/2019    GLUF 157 (H) 05/07/2021    CALCIUM 9 1 05/07/2021    AST 13 05/07/2021    ALT 18 05/07/2021    ALKPHOS 59 05/07/2021    TP 7 5 05/07/2021    TBILI 0 49 05/07/2021    EGFR 123 05/07/2021         Lab Results   Component Value Date    HDL 67 05/07/2021    TRIG 29 05/07/2021       Lab Results   Component Value Date    XLO1GMWCCDBQ 2 410 12/11/2020    WMT1DVLFWXNK 1 540 05/28/2020    SDE6RAIAVTZG 0 599 11/09/2019     Lab Results   Component Value Date    FREET4 1 03 12/11/2020       Impression & Plan:    Problem List Items Addressed This Visit        Endocrine    Type 1 diabetes mellitus without complication ([address] ) - Primary     At goal however having consistent morning fasting in the 160s, occasional spikes with meals, and evening hyperglycemia   Frequent suspends by sensor likely causing morning hyperglycemia especially after using correction before bed  Will make following changes to pump settings  Basal rate 8 pm 0 375, correction factor 62, carb ratio 10  Referral given for diabetes education to review automode now that her total basal is over 8 units  Provided with [employer] information to discuss sensor issues  Upload pump sensor/report in 1-2 weeks for review          Relevant Orders    Ambulatory referral to Diabetic Education    Hemoglobin A9N    Basic metabolic panel    Comprehensive metabolic panel    Lipid Panel with Direct LDL reflex    Microalbumin / creatinine urine ratio       Other    Insulin pump in place          Orders Placed This Encounter   Procedures    Hemoglobin A1C     Standing Status:   Future     Standing Expiration Date:   5/12/2022    Basic metabolic panel     This is a patient instruction: Patient fasting for 8 hours or longer recommended  Standing Status:   Future     Standing Expiration Date:   8/12/2021    Comprehensive metabolic panel     This is a patient instruction: Patient fasting for 8 hours or longer recommended  Standing Status:   Future     Standing Expiration Date:   5/12/2022    Lipid Panel with Direct LDL reflex     This is a patient instruction: This test requires patient fasting for 10-12 hours or longer  Drinking of black coffee or black tea is acceptable       Standing Status:   Future     Standing Expiration Date:   5/12/2022    Microalbumin / creatinine urine ratio     Standing Status:   Future     Standing Expiration Date:   5/12/2022    Ambulatory referral to Diabetic Education     Standing Status:   Future     Standing Expiration Date:   5/12/2022     Referral Priority:   Routine     Referral Type:   Consult - AMB     Referral Reason:   Specialty Services Required     Requested Specialty:   Diabetes Services     Number of Visits Requested:   1     Expiration Date:   5/12/2022     I have spent 45 minutes with Patient  today in which greater than 50% of this time was spent in counseling/coordination of care regarding Risks and benefits of tx options, Intructions for management and Patient and family education  Discussed with the patient and all questioned fully answered  She will call me if any problems arise  Follow-up appointment in 3 months       Counseled patient on diagnostic results, prognosis, risk and benefit of treatment options, instruction for management, importance of treatment compliance, Risk  factor reduction and impressions      Aileen Bobby [address] [facility]

## 2021-05-21 NOTE — PROGRESS NOTES
Established Patient Progress Note      Chief Complaint   Patient presents with    Diabetes Type 1           History of Present Illness:   Mikala Corea is a 25 y o  female with a history of type 1 diabetes with long term use of insulin since June 2018  Reports complications of none  Denies recent illness or hospitalizations  Denies recent severe hypoglycemic or severe hyperglycemic episodes  Denies any issues with her current regimen  home glucose monitoring: are performed regularly 6-8x per day  She started Medtronic 670G insulin pump earlier today and is following pump start protocol with out education department  Starting pump settings  BASAL 0 075  Carb ratio 15  ISF 60  Target 120  IOB 4 hours    Needs Lantus pen rx for backup (was on 12 units daily pre-pump), paper rx given  Needs more humalog through mail order, rx [facility]  Has Syringes  Has  Ketone test strips  Has Glucagon  Hasn't been able to get contour next test strips covered so unable to use linking meter, denial in chart    Current regimen: Medtronic 670G pump started today, prescribed by our office  Was told by education to switch to prior injection regimen if needed for backup for pump failure       Last Eye Exam: UTD with eye exam, no retinopathy, seen by dr Onofre Gasca  Last Foot Exam: today      Thyroid disorders: TSH normal  Celiac Screen Normal 6/2018  On birth control with no plans for pregnancy in near future    Patient Active Problem List   Diagnosis    Breakthrough bleeding    Classic migraine with aura    Migraine headache    Tension type headache    Type 1 diabetes mellitus without complication ([facility] [address] )      Past Medical History:   Diagnosis Date    Diabetes mellitus ([address] )     type 1    Migraine       Past Surgical History:   Procedure Laterality Date    MOUTH SURGERY  2012    TOOTH EXTRACTION      WISDOM TOOTH EXTRACTION        Family History   Problem Relation Age of Onset    Hypertension Mother Inpatient Rehabilitation Interdisciplinary Overall Plan of Care      05/21/21 1006   Diagnosis   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 Yes   Medical   Active Medical Problems See MD progress note;See problem list   Medication Management   Medication Management To be assessed   Skin Integrity   Skin Integrity Skin intact   Pain   Pain No   Bladder   Bladder Always continent   Devices Required Up to toilet;Urinal   Level of Assistance Supervision   Bowel   Bowel Always continent   Devices Required Up to toilet;Medication;Bowel program   Level of Assistance To be assessed  (NO BM SINCE ADMIT)   Swallow and/or Eating   Diet General  (pediatric)   Liquids Thin   Level of Assistance to Feed Self Independent - no assist   Communication   Comprehension Independent   Expression Independent   Communication D/C Goal Independent   Cognition and/or Safety   Attention Independent   Memory Independent   Problem Solving Independent   Social Interaction Independent   Safety Awareness Modified independent   Patient Safety Measures Needed   (per PT/OT)   Cognition and/or Safety D/C Goal Independent   Mobility   Bed Mobility Current Independent - no assist   Bed Mobility D/C Goal Independent - no assist   Chair Transfer Current Partial / Moderate Assistance - helper does less than half the effort   Chair Transfer D/C Goal Independent - no assist   Locomotion Ambulation   Locomotion Primary Device No device  (May need RW for safety)   Locomotion Distance (ft) 150 ft   Locomotion Current  Partial / Moderate Assistance - helper does less than half the effort   Locomotion D/C Goal Independent - no assist  (with LRAD)   Stairs Attempted 12   Railings Right   Stairs Current Partial / Moderate Assistance - helper does less than half the effort   Stairs D/C Goal Supervision - verbal cues   Self Cares   Grooming Current Supervision - verbal cues   Grooming D/C Goal Independent - no assist   Bathing Current Partial / Moderate Assistance - helper does less than half the effort   Bathing D/C Goal Independent - no assist   UE Dressing Current Supervision - verbal cues   UE Dressing D/C Goal Independent - no assist   LE Dressing Current Supervision - verbal cues   LE Dressing D/C Goal Independent - no assist   Toileting Current Partial / Moderate Assistance - helper does less than half the effort   Toileting D/C Goal Independent - no assist   Toilet Transfer Current Partial / Moderate Assistance - helper does less than half the effort   Toilet Transfer D/C Goal Independent - no assist   Tub or Shower Transfer Current Partial / Moderate Assistance - helper does less than half the effort   Tub or Shower Transfer D/C Goal Independent - no assist   Minimum Required Therapy   Therapy Plan Patient will receive at least 3 hours per day on at least 5 of 7 days   Occupational Therapy Hours per Day 1 to 1.5   Occupational Therapy Days Per Week 5   Physical Therapy Hours Per Day 1 to 1.5   Physical Therapy Days Per Week 5   Speech Therapy Hours Per Day 0.5 to 1   Speech Therapy Days Per Week 5   Recommendations   Recommendations Outpatient therapy   Outpatient Therapy PT;OT   Planned Discharge Destination Home  (Discharge Date: 5/25/2021)   Signature Sheet Completed? Yes - to be scanned at discharge       Cancer Maternal Grandmother     Hypertension Maternal Grandmother     Cancer Maternal Grandfather     Diabetes Maternal Grandfather     Heart disease Maternal Grandfather     Hypertension Maternal Grandfather     Diabetes Paternal Grandmother     Heart disease Paternal Grandmother     Hypertension Paternal Grandmother     Arthritis Other     Diabetes Other     Arthritis Other     Diabetes Other     No Known Problems Father      Social History   Substance Use Topics    Smoking status: Never Smoker    Smokeless tobacco: Never Used    Alcohol use No      Comment: rarely     Allergies   Allergen Reactions    No Active Allergies     No Known Allergies          Current Outpatient Prescriptions:     acetone, urine, test strip, 100 strips by Does not apply route as needed for high blood sugar, Disp: 100 each, Rfl: 3    Blood Glucose Monitoring Suppl (ONETOUCH VERIO) w/Device KIT, by Does not apply route 4 (four) times a day Use as directed, Disp: 1 kit, Rfl: 0    CONTOUR NEXT TEST test strip, Test 8-10 times a day as instructed, Disp: 300 each, Rfl: 3    glucagon (GLUCAGON EMERGENCY) 1 MG injection, Inject 1 mg under the skin once as needed for low blood sugar for up to 1 dose, Disp: 2 each, Rfl: 3    insulin lispro (HumaLOG) 100 units/mL injection, Use up to 50 units per day via pump, Disp: 50 mL, Rfl: 1    Insulin Syringes, Disposable, U-100 1 ML MISC, Using syringe daily to fill insulin cartridge as directed, Disp: 30 each, Rfl: 6    insulin glargine (LANTUS SOLOSTAR) 100 units/mL injection pen, 12 units daily for pump backup, Disp: 5 pen, Rfl: 0    Review of Systems   Constitutional: Negative for activity change, appetite change, chills, diaphoresis, fatigue, fever and unexpected weight change  HENT: Negative for trouble swallowing and voice change  Eyes: Negative for visual disturbance  Respiratory: Negative for shortness of breath      Cardiovascular: Negative for chest pain and palpitations  Gastrointestinal: Negative for abdominal pain, constipation and diarrhea  Endocrine: Negative for cold intolerance, heat intolerance, polydipsia, polyphagia and polyuria  Genitourinary: Negative for frequency and menstrual problem  Musculoskeletal: Negative for arthralgias and myalgias  Skin: Negative for rash  Allergic/Immunologic: Negative for food allergies  Neurological: Negative for dizziness and tremors  Hematological: Negative for adenopathy  Psychiatric/Behavioral: Negative for sleep disturbance  All other systems reviewed and are negative  Physical Exam:  Body mass index is 23 32 kg/m²  /74   Pulse 76   Ht 4' 10" (1 473 m)   Wt 50 6 kg (111 lb 9 6 oz)   BMI 23 32 kg/m²    Wt Readings from Last 3 Encounters:   10/29/18 50 6 kg (111 lb 9 6 oz)   09/24/18 50 3 kg (111 lb)   07/26/18 49 6 kg (109 lb 4 8 oz)       Physical Exam   Constitutional: She is oriented to person, place, and time  She appears well-developed and well-nourished  No distress  HENT:   Head: Normocephalic and atraumatic  Eyes: Pupils are equal, round, and reactive to light  Conjunctivae are normal    Neck: Normal range of motion  Neck supple  No thyromegaly present  Cardiovascular: Normal rate, regular rhythm and normal heart sounds  Pulses are no weak pulses  Pulses:       Dorsalis pedis pulses are 2+ on the right side, and 2+ on the left side  Posterior tibial pulses are 2+ on the right side, and 2+ on the left side  Pulmonary/Chest: Effort normal and breath sounds normal  No respiratory distress  She has no wheezes  She has no rales  Abdominal: Soft  Bowel sounds are normal  She exhibits no distension  There is no tenderness  Musculoskeletal: Normal range of motion  She exhibits no edema  Feet:   Right Foot:   Skin Integrity: Negative for ulcer, skin breakdown, erythema, warmth, callus or dry skin     Left Foot:   Skin Integrity: Negative for ulcer, skin breakdown, erythema, warmth, callus or dry skin  Neurological: She is alert and oriented to person, place, and time  Skin: Skin is warm and dry  Psychiatric: She has a normal mood and affect  Vitals reviewed  Patient's shoes and socks removed  Right Foot/Ankle   Right Foot Inspection  Skin Exam: skin normal and skin intact no dry skin, no warmth, no callus, no erythema, no maceration, no abnormal color, no pre-ulcer, no ulcer and no callus                          Toe Exam: ROM and strength within normal limitsno swelling, no tenderness, erythema and  no right toe deformity  Sensory       Monofilament testing: intact  Vascular  Capillary refills: < 3 seconds  The right DP pulse is 2+  The right PT pulse is 2+  Left Foot/Ankle  Left Foot Inspection  Skin Exam: skin normal and skin intactno dry skin, no warmth, no erythema, no maceration, normal color, no pre-ulcer, no ulcer and no callus                         Toe Exam: ROM and strength within normal limitsno swelling, no tenderness, no erythema and no left toe deformity                   Sensory       Monofilament: intact  Vascular  Capillary refills: < 3 seconds  The left DP pulse is 2+  The left PT pulse is 2+  Assign Risk Category:  No deformity present; No loss of protective sensation;  No weak pulses       Risk: 0      Labs:   Component      Latest Ref Rng & Units 6/13/2018 6/14/2018 10/20/2018   Sodium      136 - 145 mmol/L   139   Potassium      3 5 - 5 3 mmol/L   4 5   Chloride      100 - 108 mmol/L   108   CO2      21 - 32 mmol/L   26   Anion Gap      4 - 13 mmol/L   5   BUN      5 - 25 mg/dL   10   Creatinine      0 60 - 1 30 mg/dL   0 65   GLUCOSE FASTING      65 - 99 mg/dL   106 (H)   Calcium      8 3 - 10 1 mg/dL   9 2   AST (SGOT)      5 - 45 U/L   14   ALT      12 - 78 U/L   18   ALK PHOS      46 - 116 U/L   70   Total Protein      6 4 - 8 2 g/dL   7 5   Albumin      3 5 - 5 0 g/dL   3 7   TOTAL BILIRUBIN      0 20 - 1 00 mg/dL   0 27   eGFR ml/min/1 73sq m   126   IGA      87 - 352 mg/dL  437 (H)    GLIADIN IGA ANTIBODIES      0 - 19 units  5    GLIADIN IGG ANTIBODIES      0 - 19 units  3    TISSUE TRANSGLUTAMINASE AB IGG      0 - 5 U/mL  4    TTG IGA      0 - 3 U/mL  <2    ENDOMYSIAL IGA ANTIBODY      Negative  Negative    Cholesterol      50 - 200 mg/dL   137   Triglycerides      <=150 mg/dL   46   HDL      40 - 60 mg/dL   53   LDL Direct      0 - 100 mg/dL   75   Non-HDL Cholesterol      mg/dl   84   EXT Creatinine Urine      mg/dL   161 0   MICROALBUM ,U,RANDOM      0 0 - 20 0 mg/L   6 6   MICROALBUMIN/CREATININE RATIO      0 - 30 mg/g creatinine   4   Hemoglobin A1C      4 2 - 6 3 % 13 0  5 7   EAG      mg/dl   117   IA-2 AUTOANTIBODIES      U/mL  24 (H)    INSULIN AUTOANTIBODY      uU/mL  <5 0    GLUTAMIC ACID DECARBOXYLASE ANTIBODY      0 0 - 5 0 U/mL  24 2 (H)    Free T4      0 76 - 1 46 ng/dL  1 13    TSH 3RD GENERATON      0 358 - 3 740 uIU/mL  0 737        Impression & Plan:    Problem List Items Addressed This Visit     Type 1 diabetes mellitus without complication ([facility] [address] ) - Primary     She started on Medtronic 670G pump today with plans for sensor training in near future then transition to automated mode  Will contact medtronic rep about getting coverage for contour next BG strips and attempt peer to peer  Follow up as scheduled with education  A1C has improved significantly to 5 7 since diagnose of Type 1 Diabetes about 5 months ago  Concerned for hypoglycemia with the low A1C and she should continue frequent glucose monitoring and start use of CGM/automode to prevent hypoglycemia  Reviewed backup plan with patient and RX for Lantus provided              Relevant Medications    insulin lispro (HumaLOG) 100 units/mL injection    insulin glargine (LANTUS SOLOSTAR) 100 units/mL injection pen      Other Visit Diagnoses     Long term current use of insulin (HCC)        Relevant Medications    insulin glargine (LANTUS SOLOSTAR) 100 units/mL injection pen          No orders of the defined types were placed in this encounter  Patient Instructions   Follow Daniela's Instructions for meals/snacks/glucose monitoring  Follow up Wednesday as scheduled for infusion set change  Call tomorrow if any severe highs/lows/questions  We will work on [id number] to get contour next strips covered  Discussed with the patient and all questioned fully answered  She will call me if any problems arise  Follow-up appointment in 1-2 months       Counseled patient on diagnostic results, prognosis, risk and benefit of treatment options, instruction for management, importance of treatment compliance, Risk  factor reduction and impressions      Adrianna Chilel PA-C

## 2021-07-23 ENCOUNTER — OFFICE VISIT (OUTPATIENT)
Dept: FAMILY MEDICINE CLINIC | Facility: MEDICAL CENTER | Age: 26
End: 2021-07-23
Payer: COMMERCIAL

## 2021-07-23 VITALS
HEART RATE: 60 BPM | HEIGHT: 58 IN | WEIGHT: 99 LBS | SYSTOLIC BLOOD PRESSURE: 110 MMHG | DIASTOLIC BLOOD PRESSURE: 70 MMHG | TEMPERATURE: 97.4 F | BODY MASS INDEX: 20.78 KG/M2

## 2021-07-23 DIAGNOSIS — R35.0 URINARY FREQUENCY: Primary | ICD-10-CM

## 2021-07-23 LAB
SL AMB  POCT GLUCOSE, UA: ABNORMAL
SL AMB LEUKOCYTE ESTERASE,UA: ABNORMAL
SL AMB POCT BILIRUBIN,UA: ABNORMAL
SL AMB POCT BLOOD,UA: ABNORMAL
SL AMB POCT KETONES,UA: ABNORMAL
SL AMB POCT NITRITE,UA: ABNORMAL
SL AMB POCT PH,UA: 6.5
SL AMB POCT SPECIFIC GRAVITY,UA: 1.01
SL AMB POCT URINE PROTEIN: ABNORMAL
SL AMB POCT UROBILINOGEN: ABNORMAL

## 2021-07-23 PROCEDURE — 3061F NEG MICROALBUMINURIA REV: CPT | Performed by: FAMILY MEDICINE

## 2021-07-23 PROCEDURE — 3008F BODY MASS INDEX DOCD: CPT | Performed by: FAMILY MEDICINE

## 2021-07-23 PROCEDURE — 99213 OFFICE O/P EST LOW 20 MIN: CPT | Performed by: FAMILY MEDICINE

## 2021-07-23 PROCEDURE — 81002 URINALYSIS NONAUTO W/O SCOPE: CPT | Performed by: FAMILY MEDICINE

## 2021-07-23 PROCEDURE — 3725F SCREEN DEPRESSION PERFORMED: CPT | Performed by: FAMILY MEDICINE

## 2021-07-23 PROCEDURE — 1036F TOBACCO NON-USER: CPT | Performed by: FAMILY MEDICINE

## 2021-07-23 PROCEDURE — 87086 URINE CULTURE/COLONY COUNT: CPT | Performed by: FAMILY MEDICINE

## 2021-07-23 RX ORDER — NITROFURANTOIN 25; 75 MG/1; MG/1
100 CAPSULE ORAL 2 TIMES DAILY
Qty: 10 CAPSULE | Refills: 0 | Status: SHIPPED | OUTPATIENT
Start: 2021-07-23 | End: 2021-07-28

## 2021-07-23 NOTE — PROGRESS NOTES
Assessment/Plan:       Diagnoses and all orders for this visit:    Urinary frequency  -     POCT urine dip  -     Urine culture  -     nitrofurantoin (MACROBID) 100 mg capsule; Take 1 capsule (100 mg total) by mouth 2 (two) times a day for 5 days    In office urinalysis does show some blood otherwise unremarkable  This may be representative of a urinary tract infection  Patient will be started on empiric treatment with Macrobid twice daily for five days  Urine sample will be sent for culture and antibiotics will be changed if needed based on results  Follow-up in two weeks if symptoms persist or sooner if needed  Subjective:      Patient ID: Wendy Rashid is a 22 y o  female  Patient presents with about one week history of urinary frequency  She believes she has a urinary tract infection  No pain with urination  No back pain  No fevers  The following portions of the patient's history were reviewed and updated as appropriate: She  has a past medical history of Diabetes mellitus ([facility] ) and Migraine  She   Patient Active Problem List    Diagnosis Date Noted    Encounter for counseling regarding contraception 04/13/2020    Encounter for gynecological examination 10/28/2019    Environmental and seasonal allergies 07/10/2019    Insulin pump in place 10/29/2018    Type 1 diabetes mellitus without complication ([facility] ) 50/44/3158    Breakthrough bleeding 07/16/2014    Classic migraine with aura 09/10/2013    Migraine headache 09/10/2013    Tension type headache 09/10/2013     She  has a past surgical history that includes Mouth surgery (2012); Tooth extraction; and [place] tooth extraction    Her family history includes Arthritis in her other and other; Cancer in her maternal grandfather and maternal grandmother; Diabetes in her maternal grandfather, other, other, and paternal grandmother; Heart disease in her maternal grandfather and paternal grandmother; Hypertension in her maternal grandfather, maternal grandmother, mother, and paternal grandmother; No Known Problems in her father  She  reports that she has never smoked  She has never used smokeless tobacco  She reports that she does not drink alcohol and does not use drugs  Current Outpatient Medications   Medication Sig Dispense Refill    acetone, urine, test strip 100 strips by Does not apply route as needed for high blood sugar 100 each 3    Adapalene 0 3 % gel Apply 1 application topically daily (face)      BAQSIMI TWO PACK 3 MG/DOSE POWD 3 mg into each nostril as needed (hypoglycemic emergency) Baqsimi, brand necessary 2 each 1    Contour Next Test test strip TEST 8-10 TIMES A DAY AS INSTRUCTED 900 each 1    desloratadine (CLARINEX) 5 MG tablet TAKE 1 TABLET (5 MG TOTAL) BY MOUTH DAILY AS NEEDED (CONGESTION) 90 tablet 1    insulin lispro (HumaLOG) 100 units/mL injection pen Use in case of pump failure 5 pen 3    insulin lispro (HumaLOG) 100 units/mL injection Use up to 50 units per day via pump 50 mL 3    Insulin Syringes, Disposable, U-100 1 ML MISC Using syringe daily to fill insulin cartridge as directed 30 each 6    minocycline (MINOCIN) 100 mg capsule Take 100 mg by mouth daily       norethindrone (MICRONOR) 0 35 MG tablet TAKE 1 TABLET BY MOUTH EVERY DAY 84 tablet 3    spironolactone (ALDACTONE) 50 mg tablet Take 50 mg by mouth 2 (two) times a day      nitrofurantoin (MACROBID) 100 mg capsule Take 1 capsule (100 mg total) by mouth 2 (two) times a day for 5 days 10 capsule 0     No current facility-administered medications for this visit       Current Outpatient Medications on File Prior to Visit   Medication Sig    acetone, urine, test strip 100 strips by Does not apply route as needed for high blood sugar    Adapalene 0 3 % gel Apply 1 application topically daily (face)    BAQSIMI TWO PACK 3 MG/DOSE POWD 3 mg into each nostril as needed (hypoglycemic emergency) Baqsimi, brand necessary    Contour Next Test test strip TEST 8-10 TIMES A DAY AS INSTRUCTED    desloratadine (CLARINEX) 5 MG tablet TAKE 1 TABLET (5 MG TOTAL) BY MOUTH DAILY AS NEEDED (CONGESTION)    insulin lispro (HumaLOG) 100 units/mL injection pen Use in case of pump failure    insulin lispro (HumaLOG) 100 units/mL injection Use up to 50 units per day via pump    Insulin Syringes, Disposable, U-100 1 ML MISC Using syringe daily to fill insulin cartridge as directed    minocycline (MINOCIN) 100 mg capsule Take 100 mg by mouth daily     norethindrone (MICRONOR) 0 35 MG tablet TAKE 1 TABLET BY MOUTH EVERY DAY    spironolactone (ALDACTONE) 50 mg tablet Take 50 mg by mouth 2 (two) times a day     No current facility-administered medications on file prior to visit  She has No Known Allergies       Review of Systems   Constitutional: Negative for fever  Respiratory: Negative for shortness of breath  Cardiovascular: Negative for chest pain  Objective:      /70 (BP Location: Left arm, Patient Position: Sitting, Cuff Size: Adult)   Pulse 60   Temp (!) 97 4 °F (36 3 °C)   Ht 4' 10" (1 473 m)   Wt 44 9 kg (99 lb)   BMI 20 69 kg/m²          Physical Exam  Constitutional:       General: She is not in acute distress  Appearance: She is not ill-appearing  Pulmonary:      Effort: No respiratory distress     Psychiatric:         Mood and Affect: Mood normal

## 2021-07-24 LAB — BACTERIA UR CULT: NORMAL

## 2021-08-16 ENCOUNTER — CLINICAL SUPPORT (OUTPATIENT)
Dept: OBGYN CLINIC | Facility: MEDICAL CENTER | Age: 26
End: 2021-08-16
Payer: COMMERCIAL

## 2021-08-16 VITALS — SYSTOLIC BLOOD PRESSURE: 100 MMHG | BODY MASS INDEX: 22.15 KG/M2 | DIASTOLIC BLOOD PRESSURE: 72 MMHG | WEIGHT: 106 LBS

## 2021-08-16 DIAGNOSIS — Z23 NEED FOR HPV VACCINATION: Primary | ICD-10-CM

## 2021-08-16 PROCEDURE — 90471 IMMUNIZATION ADMIN: CPT

## 2021-08-16 PROCEDURE — 90651 9VHPV VACCINE 2/3 DOSE IM: CPT

## 2021-09-18 ENCOUNTER — APPOINTMENT (OUTPATIENT)
Dept: LAB | Age: 26
End: 2021-09-18
Payer: COMMERCIAL

## 2021-09-18 DIAGNOSIS — E10.9 TYPE 1 DIABETES MELLITUS WITHOUT COMPLICATION (HCC): ICD-10-CM

## 2021-09-18 LAB
ALBUMIN SERPL BCP-MCNC: 3.9 G/DL (ref 3.5–5)
ALP SERPL-CCNC: 54 U/L (ref 46–116)
ALT SERPL W P-5'-P-CCNC: 20 U/L (ref 12–78)
ANION GAP SERPL CALCULATED.3IONS-SCNC: 5 MMOL/L (ref 4–13)
AST SERPL W P-5'-P-CCNC: 12 U/L (ref 5–45)
BILIRUB SERPL-MCNC: 0.49 MG/DL (ref 0.2–1)
BUN SERPL-MCNC: 10 MG/DL (ref 5–25)
CALCIUM SERPL-MCNC: 8.8 MG/DL (ref 8.3–10.1)
CHLORIDE SERPL-SCNC: 109 MMOL/L (ref 100–108)
CHOLEST SERPL-MCNC: 135 MG/DL (ref 50–200)
CO2 SERPL-SCNC: 25 MMOL/L (ref 21–32)
CREAT SERPL-MCNC: 0.55 MG/DL (ref 0.6–1.3)
CREAT UR-MCNC: 28.4 MG/DL
EST. AVERAGE GLUCOSE BLD GHB EST-MCNC: 128 MG/DL
GFR SERPL CREATININE-BSD FRML MDRD: 131 ML/MIN/1.73SQ M
GLUCOSE P FAST SERPL-MCNC: 115 MG/DL (ref 65–99)
HBA1C MFR BLD: 6.1 %
HDLC SERPL-MCNC: 67 MG/DL
LDLC SERPL CALC-MCNC: 62 MG/DL (ref 0–100)
MICROALBUMIN UR-MCNC: <5 MG/L (ref 0–20)
MICROALBUMIN/CREAT 24H UR: <18 MG/G CREATININE (ref 0–30)
POTASSIUM SERPL-SCNC: 3.7 MMOL/L (ref 3.5–5.3)
PROT SERPL-MCNC: 7.3 G/DL (ref 6.4–8.2)
SODIUM SERPL-SCNC: 139 MMOL/L (ref 136–145)
TRIGL SERPL-MCNC: 29 MG/DL

## 2021-09-18 PROCEDURE — 82570 ASSAY OF URINE CREATININE: CPT

## 2021-09-18 PROCEDURE — 80053 COMPREHEN METABOLIC PANEL: CPT

## 2021-09-18 PROCEDURE — 82043 UR ALBUMIN QUANTITATIVE: CPT

## 2021-09-18 PROCEDURE — 80061 LIPID PANEL: CPT

## 2021-09-18 PROCEDURE — 83036 HEMOGLOBIN GLYCOSYLATED A1C: CPT

## 2021-09-18 PROCEDURE — 36415 COLL VENOUS BLD VENIPUNCTURE: CPT

## 2021-09-18 PROCEDURE — 3044F HG A1C LEVEL LT 7.0%: CPT | Performed by: NURSE PRACTITIONER

## 2021-09-22 ENCOUNTER — OFFICE VISIT (OUTPATIENT)
Dept: ENDOCRINOLOGY | Facility: CLINIC | Age: 26
End: 2021-09-22
Payer: COMMERCIAL

## 2021-09-22 VITALS
DIASTOLIC BLOOD PRESSURE: 70 MMHG | HEART RATE: 88 BPM | HEIGHT: 58 IN | SYSTOLIC BLOOD PRESSURE: 120 MMHG | WEIGHT: 102 LBS | BODY MASS INDEX: 21.41 KG/M2

## 2021-09-22 DIAGNOSIS — E10.9 TYPE 1 DIABETES MELLITUS WITHOUT COMPLICATION (HCC): Primary | ICD-10-CM

## 2021-09-22 DIAGNOSIS — Z96.41 INSULIN PUMP IN PLACE: ICD-10-CM

## 2021-09-22 PROCEDURE — 3008F BODY MASS INDEX DOCD: CPT | Performed by: NURSE PRACTITIONER

## 2021-09-22 PROCEDURE — 99215 OFFICE O/P EST HI 40 MIN: CPT | Performed by: NURSE PRACTITIONER

## 2021-09-22 PROCEDURE — 1036F TOBACCO NON-USER: CPT | Performed by: NURSE PRACTITIONER

## 2021-09-22 PROCEDURE — 95251 CONT GLUC MNTR ANALYSIS I&R: CPT | Performed by: NURSE PRACTITIONER

## 2021-09-22 RX ORDER — GLUCAGON 3 MG/1
3 POWDER NASAL AS NEEDED
Qty: 2 EACH | Refills: 1 | Status: SHIPPED | OUTPATIENT
Start: 2021-09-22

## 2021-09-22 RX ORDER — BLOOD SUGAR DIAGNOSTIC
STRIP MISCELLANEOUS
Qty: 900 EACH | Refills: 1 | Status: SHIPPED | OUTPATIENT
Start: 2021-09-22 | End: 2021-09-23

## 2021-09-22 NOTE — PROGRESS NOTES
Established Patient Progress Note      Chief Complaint   Patient presents with    Diabetes Type 1          History of Present Illness:   Zach Islas is a 22 y o  female with a history of type 1 diabetes with long term use of insulin since June 2018  Reports no complications of diabetes  Last A1C 6 1  She prefers to use manual mode vs auto mode  Denies recent illness or hospitalizations  Denies recent severe hypoglycemic or severe hyperglycemic episodes  Denies any issues with her current regimen  Home glucose monitoring: are performed regularly    Patient is on a Medtronic 670G pump prescribed by endocrinologist  She has been on a pump since 10/29/18    She is in manual mode 100% of the time  She denies any malfunctioning of the pump        Current Insulin pump settings:  Basal rate: 12 am 0 700, 6:30 am 0 325, 12 pm 0 350, 8 pm 0 350  Insulin to carb ratio:12 am 9 2, 7 am 8 3, 3 pm 8 3  Insulin sensitivity factor: 60  BG target: 120  Active Insulin time: 4 hours     Type of insulin:  Humalog    Zach Islas   Device used guardian  Home use     Indication   Type 1 Diabetes    More than 72 hours of data was reviewed  Report to be scanned to chart  Date Range: 9/09-9/22    Analysis of data:   Average Glucose: 135  Coefficient of Variation: 135   Time in Target Range: 88%   Time Above Range: 11%   Time Below Range: 1%     Interpretation of data: BG stable in the morning, occasional episodes of hypoglycemia noted after lunch and dinner      Backup Plan: patient aware that in case of malfunctioning of the pump or unexplained hyperglycemia to use basal and bolus therapy as backup which is prescribed to the patient  Also notified patient to call clinic and/or pump company if any issues or go to emergency department if signs/symptoms of DKA  compliant all of the timedenies any side effects from current medications     Hypoglycemic episodes: Yes occasional   H/o of hypoglycemia causing hospitalization or Intervention such as glucagon injection or ambulance call Yes   Hypoglycemia symptoms: dizziness, headache and sweating   Treatment of hypoglycemia: orange juice     Last Eye Exam: saw Dr Francheska Saenz June 2020, will request records   Last Foot Exam: performed today        Patient Active Problem List   Diagnosis    Breakthrough bleeding    Classic migraine with aura    Migraine headache    Tension type headache    Type 1 diabetes mellitus without complication (HCC)    Insulin pump in place    Environmental and seasonal allergies    Encounter for gynecological examination    Encounter for counseling regarding contraception      Past Medical History:   Diagnosis Date    Diabetes mellitus ([address] )     type 1    Migraine       Past Surgical History:   Procedure Laterality Date    MOUTH SURGERY  2012    TOOTH EXTRACTION      WISDOM TOOTH EXTRACTION        Family History   Problem Relation Age of Onset    Hypertension Mother     Cancer Maternal Grandmother     Hypertension Maternal Grandmother     Cancer Maternal Grandfather     Diabetes Maternal Grandfather     Heart disease Maternal Grandfather     Hypertension Maternal Grandfather     Diabetes Paternal Grandmother     Heart disease Paternal Grandmother     Hypertension Paternal Grandmother     Arthritis Other     Diabetes Other     Arthritis Other     Diabetes Other     No Known Problems Father      Social History     Tobacco Use    Smoking status: Never Smoker    Smokeless tobacco: Never Used   Substance Use Topics    Alcohol use: Not Currently     No Known Allergies      Current Outpatient Medications:     acetone, urine, test strip, 100 strips by Does not apply route as needed for high blood sugar, Disp: 100 each, Rfl: 3    Baqsimi Two Pack 3 MG/DOSE POWD, 3 mg into each nostril as needed (hypoglycemic emergency) Baqsimi, brand necessary, Disp: 2 each, Rfl: 1    Contour Next Test test strip, Use as instructed, Disp: 900 each, Rfl: 1    insulin lispro (HumaLOG) 100 units/mL injection pen, Use in case of pump failure, Disp: 5 pen, Rfl: 3    insulin lispro (HumaLOG) 100 units/mL injection, Use up to 50 units per day via pump, Disp: 50 mL, Rfl: 3    Insulin Syringes, Disposable, U-100 1 ML MISC, Using syringe daily to fill insulin cartridge as directed, Disp: 30 each, Rfl: 6    norethindrone (MICRONOR) 0 35 MG tablet, TAKE 1 TABLET BY MOUTH EVERY DAY, Disp: 84 tablet, Rfl: 3    Adapalene 0 3 % gel, Apply 1 application topically daily (face) (Patient not taking: Reported on 8/16/2021), Disp: , Rfl:     desloratadine (CLARINEX) 5 MG tablet, TAKE 1 TABLET (5 MG TOTAL) BY MOUTH DAILY AS NEEDED (CONGESTION) (Patient not taking: Reported on 8/16/2021), Disp: 90 tablet, Rfl: 1    minocycline (MINOCIN) 100 mg capsule, Take 100 mg by mouth daily  (Patient not taking: Reported on 8/16/2021), Disp: , Rfl:     spironolactone (ALDACTONE) 50 mg tablet, Take 50 mg by mouth 2 (two) times a day (Patient not taking: Reported on 8/16/2021), Disp: , Rfl:     Review of Systems   Constitutional: Negative for chills and fever  HENT: Negative for sore throat and trouble swallowing  Eyes: Negative for visual disturbance  Respiratory: Negative for shortness of breath  Cardiovascular: Negative for chest pain and palpitations  Gastrointestinal: Negative for abdominal pain, constipation and diarrhea  Endocrine: Negative for cold intolerance, heat intolerance, polydipsia, polyphagia and polyuria  Genitourinary: Negative for frequency  Musculoskeletal: Negative for arthralgias and myalgias  Skin: Negative for rash  Neurological: Negative for dizziness and tremors  Hematological: Negative for adenopathy  Psychiatric/Behavioral: Negative for sleep disturbance  All other systems reviewed and are negative  Physical Exam:  Body mass index is 21 32 kg/m²    /70   Pulse 88   Ht 4' 10" (1 473 m)   Wt 46 3 kg (102 lb) BMI 21 32 kg/m²    Wt Readings from Last 3 Encounters:   09/22/21 46 3 kg (102 lb)   08/16/21 48 1 kg (106 lb)   07/23/21 44 9 kg (99 lb)       Physical Exam  Vitals reviewed  Constitutional:       General: She is not in acute distress  Appearance: She is well-developed  HENT:      Head: Normocephalic and atraumatic  Eyes:      Conjunctiva/sclera: Conjunctivae normal       Pupils: Pupils are equal, round, and reactive to light  Neck:      Thyroid: No thyromegaly  Cardiovascular:      Rate and Rhythm: Normal rate and regular rhythm  Pulses: no weak pulses          Dorsalis pedis pulses are 2+ on the right side and 2+ on the left side  Posterior tibial pulses are 2+ on the right side and 2+ on the left side  Heart sounds: Normal heart sounds  Pulmonary:      Effort: Pulmonary effort is normal  No respiratory distress  Breath sounds: Normal breath sounds  No wheezing or rales  Abdominal:      General: Bowel sounds are normal  There is no distension  Palpations: Abdomen is soft  Tenderness: There is no abdominal tenderness  Musculoskeletal:         General: Normal range of motion  Cervical back: Normal range of motion and neck supple  Feet:      Right foot:      Skin integrity: No ulcer, skin breakdown, erythema, warmth, callus or dry skin  Left foot:      Skin integrity: No ulcer, skin breakdown, erythema, warmth, callus or dry skin  Skin:     General: Skin is warm and dry  Neurological:      Mental Status: She is alert and oriented to person, place, and time  Patient's shoes and socks removed  Right Foot/Ankle   Right Foot Inspection  Skin Exam: skin normal skin not intact, no dry skin, no warmth, no callus, no erythema, no maceration, no abnormal color, no pre-ulcer, no ulcer and no callus                            Sensory   Vibration: intact    Monofilament testing: intact  Vascular  Capillary refills: < 3 seconds  The right DP pulse is 2+  The right PT pulse is 2+  Left Foot/Ankle  Left Foot Inspection  Skin Exam: skin normalskin not intact, no dry skin, no warmth, no erythema, no maceration, normal color, no pre-ulcer, no ulcer and no callus                                         Sensory   Vibration: intact    Monofilament: intact  Vascular  Capillary refills: < 3 seconds  The left DP pulse is 2+  The left PT pulse is 2+  Assign Risk Category:  No deformity present; No loss of protective sensation; No weak pulses       Risk: 0      Labs:     Lab Results   Component Value Date    HGBA1C 6 1 (H) 09/18/2021       Lab Results   Component Value Date    SODIUM 139 09/18/2021    K 3 7 09/18/2021     (H) 09/18/2021    CO2 25 09/18/2021    AGAP 5 09/18/2021    BUN 10 09/18/2021    CREATININE 0 55 (L) 09/18/2021    GLUC 117 03/22/2019    GLUF 115 (H) 09/18/2021    CALCIUM 8 8 09/18/2021    AST 12 09/18/2021    ALT 20 09/18/2021    ALKPHOS 54 09/18/2021    TP 7 3 09/18/2021    TBILI 0 49 09/18/2021    EGFR 131 09/18/2021         Lab Results   Component Value Date    HDL 67 09/18/2021    TRIG 29 09/18/2021       Lab Results   Component Value Date    PSC1WGMNZIFH 2 410 12/11/2020    XIS3HFFSPAFP 1 540 05/28/2020    YLZ6LXVKRXKF 0 599 11/09/2019     Lab Results   Component Value Date    FREET4 1 03 12/11/2020       Impression & Plan:    Problem List Items Addressed This Visit        Endocrine    Type 1 diabetes mellitus without complication ([facility] [address] ) - Primary     Tightly controlled based on recent A1C with occasional hypoglycemia after lunch and dinner  Change carb ratio at 7 am and 3 pm to 9  Send in pump/sensor report in two weeks for review  Notify office if BG less than 70            Relevant Medications    Baqsimi Two Pack 3 MG/DOSE POWD    Contour Next Test test strip    Other Relevant Orders    Hemoglobin A1C    Comprehensive metabolic panel       Other    Insulin pump in place          Orders Placed This Encounter   Procedures    Hemoglobin A1C Standing Status:   Future     Standing Expiration Date:   9/22/2022    Comprehensive metabolic panel     This is a patient instruction: Patient fasting for 8 hours or longer recommended  Standing Status:   Future     Standing Expiration Date:   9/22/2022       Discussed with the patient and all questioned fully answered  She will call me if any problems arise  Follow-up appointment in 3 months       Counseled patient on diagnostic results, prognosis, risk and benefit of treatment options, instruction for management, importance of treatment compliance, Risk  factor reduction and impressions      Aileen Bobby [address] [facility]

## 2021-09-22 NOTE — ASSESSMENT & PLAN NOTE
Tightly controlled based on recent A1C with occasional hypoglycemia after lunch and dinner  Change carb ratio at 7 am and 3 pm to 9  Send in pump/sensor report in two weeks for review  Notify office if BG less than 70

## 2021-09-24 DIAGNOSIS — E10.9 TYPE 1 DIABETES MELLITUS WITHOUT COMPLICATION (HCC): ICD-10-CM

## 2021-09-24 RX ORDER — INSULIN LISPRO 100 [IU]/ML
INJECTION, SOLUTION INTRAVENOUS; SUBCUTANEOUS
Qty: 15 ML | Refills: 3 | Status: SHIPPED | OUTPATIENT
Start: 2021-09-24 | End: 2021-10-12 | Stop reason: SDUPTHER

## 2021-09-27 DIAGNOSIS — E10.9 TYPE 1 DIABETES MELLITUS WITHOUT COMPLICATION (HCC): Primary | ICD-10-CM

## 2021-09-27 NOTE — TELEPHONE ENCOUNTER
Received a request for Lantus insulin from [address] [place] [address] , this insulin is not longer under Pt's active med list but she use to have it for a back up if she has problems with her Pump     I did call pt, left message requesting a call back to see if she would like to have her Lantus refill

## 2021-09-28 RX ORDER — INSULIN GLARGINE 100 [IU]/ML
INJECTION, SOLUTION SUBCUTANEOUS
Qty: 15 ML | Refills: 0 | Status: SHIPPED | OUTPATIENT
Start: 2021-09-28 | End: 2021-09-30 | Stop reason: SDUPTHER

## 2021-10-12 DIAGNOSIS — E10.9 TYPE 1 DIABETES MELLITUS WITHOUT COMPLICATION (HCC): ICD-10-CM

## 2021-10-13 RX ORDER — INSULIN LISPRO 100 [IU]/ML
INJECTION, SOLUTION INTRAVENOUS; SUBCUTANEOUS
Qty: 15 ML | Refills: 3 | Status: SHIPPED | OUTPATIENT
Start: 2021-10-13

## 2021-11-16 DIAGNOSIS — Z30.41 ENCOUNTER FOR SURVEILLANCE OF CONTRACEPTIVE PILLS: ICD-10-CM

## 2021-11-16 RX ORDER — ACETAMINOPHEN AND CODEINE PHOSPHATE 120; 12 MG/5ML; MG/5ML
SOLUTION ORAL
Qty: 84 TABLET | Refills: 4 | Status: SHIPPED | OUTPATIENT
Start: 2021-11-16

## 2021-12-13 ENCOUNTER — ANNUAL EXAM (OUTPATIENT)
Dept: OBGYN CLINIC | Facility: MEDICAL CENTER | Age: 26
End: 2021-12-13
Payer: COMMERCIAL

## 2021-12-13 VITALS — BODY MASS INDEX: 21.83 KG/M2 | HEIGHT: 58 IN | WEIGHT: 104 LBS

## 2021-12-13 DIAGNOSIS — Z01.419 ENCOUNTER FOR GYNECOLOGICAL EXAMINATION: Primary | ICD-10-CM

## 2021-12-13 PROCEDURE — 1036F TOBACCO NON-USER: CPT | Performed by: STUDENT IN AN ORGANIZED HEALTH CARE EDUCATION/TRAINING PROGRAM

## 2021-12-13 PROCEDURE — G0145 SCR C/V CYTO,THINLAYER,RESCR: HCPCS | Performed by: STUDENT IN AN ORGANIZED HEALTH CARE EDUCATION/TRAINING PROGRAM

## 2021-12-13 PROCEDURE — 3008F BODY MASS INDEX DOCD: CPT | Performed by: STUDENT IN AN ORGANIZED HEALTH CARE EDUCATION/TRAINING PROGRAM

## 2021-12-13 PROCEDURE — 99395 PREV VISIT EST AGE 18-39: CPT | Performed by: STUDENT IN AN ORGANIZED HEALTH CARE EDUCATION/TRAINING PROGRAM

## 2021-12-17 LAB
LAB AP GYN PRIMARY INTERPRETATION: NORMAL
Lab: NORMAL

## 2022-01-05 ENCOUNTER — APPOINTMENT (OUTPATIENT)
Dept: LAB | Age: 27
End: 2022-01-05
Payer: COMMERCIAL

## 2022-01-05 DIAGNOSIS — E10.9 TYPE 1 DIABETES MELLITUS WITHOUT COMPLICATION (HCC): ICD-10-CM

## 2022-01-05 LAB
ALBUMIN SERPL BCP-MCNC: 4.2 G/DL (ref 3.5–5)
ALP SERPL-CCNC: 54 U/L (ref 46–116)
ALT SERPL W P-5'-P-CCNC: 17 U/L (ref 12–78)
ANION GAP SERPL CALCULATED.3IONS-SCNC: 7 MMOL/L (ref 4–13)
AST SERPL W P-5'-P-CCNC: 12 U/L (ref 5–45)
BILIRUB SERPL-MCNC: 0.89 MG/DL (ref 0.2–1)
BUN SERPL-MCNC: 15 MG/DL (ref 5–25)
CALCIUM SERPL-MCNC: 9.9 MG/DL (ref 8.3–10.1)
CHLORIDE SERPL-SCNC: 107 MMOL/L (ref 100–108)
CO2 SERPL-SCNC: 26 MMOL/L (ref 21–32)
CREAT SERPL-MCNC: 0.68 MG/DL (ref 0.6–1.3)
EST. AVERAGE GLUCOSE BLD GHB EST-MCNC: 140 MG/DL
GFR SERPL CREATININE-BSD FRML MDRD: 121 ML/MIN/1.73SQ M
GLUCOSE P FAST SERPL-MCNC: 91 MG/DL (ref 65–99)
HBA1C MFR BLD: 6.5 %
POTASSIUM SERPL-SCNC: 3.6 MMOL/L (ref 3.5–5.3)
PROT SERPL-MCNC: 7.4 G/DL (ref 6.4–8.2)
SODIUM SERPL-SCNC: 140 MMOL/L (ref 136–145)

## 2022-01-05 PROCEDURE — 3044F HG A1C LEVEL LT 7.0%: CPT

## 2022-01-05 PROCEDURE — 36415 COLL VENOUS BLD VENIPUNCTURE: CPT

## 2022-01-05 PROCEDURE — 80053 COMPREHEN METABOLIC PANEL: CPT

## 2022-01-05 PROCEDURE — 83036 HEMOGLOBIN GLYCOSYLATED A1C: CPT

## 2022-01-12 ENCOUNTER — OFFICE VISIT (OUTPATIENT)
Dept: ENDOCRINOLOGY | Facility: CLINIC | Age: 27
End: 2022-01-12
Payer: COMMERCIAL

## 2022-01-12 VITALS
WEIGHT: 102.38 LBS | HEIGHT: 58 IN | HEART RATE: 94 BPM | BODY MASS INDEX: 21.49 KG/M2 | DIASTOLIC BLOOD PRESSURE: 80 MMHG | SYSTOLIC BLOOD PRESSURE: 110 MMHG

## 2022-01-12 DIAGNOSIS — E10.9 TYPE 1 DIABETES MELLITUS WITHOUT COMPLICATION (HCC): Primary | ICD-10-CM

## 2022-01-12 DIAGNOSIS — Z96.41 INSULIN PUMP IN PLACE: ICD-10-CM

## 2022-01-12 PROCEDURE — 3074F SYST BP LT 130 MM HG: CPT

## 2022-01-12 PROCEDURE — 3079F DIAST BP 80-89 MM HG: CPT

## 2022-01-12 PROCEDURE — 1036F TOBACCO NON-USER: CPT

## 2022-01-12 PROCEDURE — 99214 OFFICE O/P EST MOD 30 MIN: CPT

## 2022-01-12 NOTE — PATIENT INSTRUCTIONS
If pump failure - use 12 units of lantus once daily     Change basal dose 9pm - 12am -  425 units from 0 40 units

## 2022-01-12 NOTE — PROGRESS NOTES
Established Patient Progress Note      Chief Complaint   Patient presents with    Diabetes Type 1        History of Present Illness:   Lovely Monteiro is a 32 y o  female with type 1 diabetes with long term use of insulin since June 2018  Last seen in the office 9/22/2021  Denies complications of diabetes  Last A1C was 6 5  She is on Medtronic 670G pump and prefers to use manual mode vs auto mode  Denies recent illness or hospitalizations  Denies recent severe hypoglycemic or severe hyperglycemic episodes  Denies any issues with her current regimen  Home glucose monitoring: are performed regularly using CGM  Patient is on a Medtronic 670G pump prescribed by endocrinologist  She has been on a pump since 10/29/18  She is in manual mode 100% of the time  Pump stopped working 3 weeks ago, new one was sent within 24 hours  Current Insulin pump settings:  Basal rate: 12 am 0 725, 6:30 VT 4 861, 39 pm 0 40  Insulin to carb ratio:12am 10, 6:30am 8 3, 3pm 8 3  Insulin sensitivity factor: 60  BG target: 120  Active Insulin time: 4 hours     Type of insulin:  Humalog    Lovely Monteiro   Device used Keldelicean   Home use   Indication: Type 1 Diabetes  More than 72 hours of data was reviewed  Report to be scanned to chart  Date Range: 12/30/2021-1/12/2022  Analysis of data:   Average Glucose: 149  SD : 43  Time in Target Range: 75%   Time Above Range: 23% high, 1% very high   Time Below Range: 1%     Interpretation of data: BGs well controlled and in range majority of the time  Having some highs late at night after dinner and into early morning  Backup Plan: patient aware that in case of malfunctioning of the pump or unexplained hyperglycemia to use basal and bolus therapy as backup which is prescribed to the patient  Also notified patient to call clinic and/or pump company if any issues or go to emergency department if signs/symptoms of DKA       Last Eye Exam: 11/1/2021, UTD, no retinopathy Last Foot Exam: 9/22/2021, UTD     Patient Active Problem List   Diagnosis    Breakthrough bleeding    Classic migraine with aura    Migraine headache    Tension type headache    Type 1 diabetes mellitus without complication (HCC)    Insulin pump in place    Environmental and seasonal allergies    Encounter for gynecological examination    Encounter for counseling regarding contraception      Past Medical History:   Diagnosis Date    Diabetes mellitus ([facility] [address] )     type 1    Migraine       Past Surgical History:   Procedure Laterality Date    MOUTH SURGERY  2012    TOOTH EXTRACTION      WISDOM TOOTH EXTRACTION        Family History   Problem Relation Age of Onset    Hypertension Mother     No Known Problems Father     Cancer Maternal Grandmother     Hypertension Maternal Grandmother     Cancer Maternal Grandfather     Diabetes Maternal Grandfather     Heart disease Maternal Grandfather     Hypertension Maternal Grandfather     Diabetes Paternal Grandmother     Heart disease Paternal Grandmother     Hypertension Paternal Grandmother     Arthritis Other     Diabetes Other     Arthritis Other     Diabetes Other     Breast cancer Neg Hx     Colon cancer Neg Hx     Ovarian cancer Neg Hx      Social History     Tobacco Use    Smoking status: Never Smoker    Smokeless tobacco: Never Used   Substance Use Topics    Alcohol use: Yes     Comment: social      No Known Allergies      Current Outpatient Medications:     acetone, urine, test strip, 100 strips by Does not apply route as needed for high blood sugar, Disp: 100 each, Rfl: 3    Baqsimi Two Pack 3 MG/DOSE POWD, 3 mg into each nostril as needed (hypoglycemic emergency) Baqsimi, brand necessary, Disp: 2 each, Rfl: 1    Contour Next Test test strip, Use 1 each 4 (four) times a day Use as instructed, Disp: 900 strip, Rfl: 1    insulin lispro (HumaLOG) 100 units/mL injection, Use up to 50 units per day via pump, Disp: 50 mL, Rfl: 3   Insulin Syringes, Disposable, U-100 1 ML MISC, Using syringe daily to fill insulin cartridge as directed, Disp: 30 each, Rfl: 6    norethindrone (MICRONOR) 0 35 MG tablet, TAKE 1 TABLET BY MOUTH EVERY DAY, Disp: 84 tablet, Rfl: 4    insulin glargine (Lantus SoloStar) 100 units/mL injection pen, Use 12 units before bed in case pump failure (Patient not taking: Reported on 1/12/2022 ), Disp: 15 mL, Rfl: 3    insulin lispro (HumaLOG KwikPen) 100 units/mL injection pen, Inject incase of pump failure (Patient not taking: Reported on 1/12/2022 ), Disp: 15 mL, Rfl: 3    insulin lispro (HumaLOG) 100 units/mL injection pen, Use in case of pump failure (Patient not taking: Reported on 1/12/2022 ), Disp: 5 pen, Rfl: 3    Review of Systems   Constitutional: Negative for activity change, appetite change, fatigue, fever and unexpected weight change  HENT: Negative for sore throat, trouble swallowing and voice change  Eyes: Negative for photophobia and pain  Respiratory: Negative for cough, chest tightness, shortness of breath and wheezing  Cardiovascular: Negative for chest pain, palpitations and leg swelling  Gastrointestinal: Negative for abdominal pain, constipation, diarrhea, nausea and vomiting  Endocrine: Negative for cold intolerance, heat intolerance, polydipsia, polyphagia and polyuria  Genitourinary: Negative for difficulty urinating, frequency and hematuria  Musculoskeletal: Negative for arthralgias, back pain, gait problem and joint swelling  Skin: Negative for color change, pallor, rash and wound  Neurological: Negative for dizziness, tremors, seizures, syncope, weakness, light-headedness and numbness  Psychiatric/Behavioral: Negative  Physical Exam:  Body mass index is 21 4 kg/m²    /80 (BP Location: Left arm, Patient Position: Sitting, Cuff Size: Standard)   Pulse 94   Ht 4' 10" (1 473 m)   Wt 46 4 kg (102 lb 6 oz)   LMP  (LMP Unknown)   BMI 21 40 kg/m²    Wt Readings from Last 3 Encounters:   01/12/22 46 4 kg (102 lb 6 oz)   12/13/21 47 2 kg (104 lb)   09/22/21 46 3 kg (102 lb)       Physical Exam  Vitals reviewed  Constitutional:       General: She is not in acute distress  Appearance: Normal appearance  She is normal weight  She is not ill-appearing or diaphoretic  HENT:      Head: Normocephalic  Right Ear: External ear normal       Left Ear: External ear normal    Eyes:      Extraocular Movements: Extraocular movements intact  Conjunctiva/sclera: Conjunctivae normal       Pupils: Pupils are equal, round, and reactive to light  Cardiovascular:      Rate and Rhythm: Normal rate and regular rhythm  Pulses: Normal pulses  Heart sounds: Normal heart sounds  No murmur heard  No friction rub  No gallop  Pulmonary:      Effort: Pulmonary effort is normal  No respiratory distress  Breath sounds: Normal breath sounds  No stridor  No wheezing, rhonchi or rales  Abdominal:      General: Bowel sounds are normal  There is no distension  Palpations: Abdomen is soft  Tenderness: There is no abdominal tenderness  Musculoskeletal:         General: No swelling, tenderness or signs of injury  Normal range of motion  Cervical back: Normal range of motion and neck supple  Right lower leg: No edema  Left lower leg: No edema  Skin:     General: Skin is warm and dry  Coloration: Skin is not jaundiced  Findings: No bruising, erythema or rash  Neurological:      General: No focal deficit present  Mental Status: She is alert and oriented to person, place, and time  Sensory: No sensory deficit  Motor: No weakness  Coordination: Coordination normal       Gait: Gait normal    Psychiatric:         Mood and Affect: Mood normal          Behavior: Behavior normal          Thought Content:  Thought content normal          Judgment: Judgment normal        Labs:   Lab Results   Component Value Date    HGBA1C 6 5 (H) 01/05/2022    HGBA1C 6 1 (H) 09/18/2021    HGBA1C 6 4 (H) 05/07/2021     Lab Results   Component Value Date    CREATININE 0 68 01/05/2022    CREATININE 0 55 (L) 09/18/2021    CREATININE 0 66 05/07/2021    BUN 15 01/05/2022    K 3 6 01/05/2022     01/05/2022    CO2 26 01/05/2022     eGFR   Date Value Ref Range Status   01/05/2022 121 ml/min/1 73sq m Final     Lab Results   Component Value Date    HDL 67 09/18/2021    TRIG 29 09/18/2021     Lab Results   Component Value Date    ALT 17 01/05/2022    AST 12 01/05/2022    ALKPHOS 54 01/05/2022     Lab Results   Component Value Date    NEZ2DESYYXWU 2 410 12/11/2020    ANN3TGOMEZOO 1 540 05/28/2020    LIW4SJTZYYVG 0 599 11/09/2019     Lab Results   Component Value Date    FREET4 1 03 12/11/2020       Impression & Plan:    Problem List Items Addressed This Visit        Endocrine    Type 1 diabetes mellitus without complication ([facility] [address] ) - Primary     A1C at goal  BGs in good control  Increase basal rate from 9PM-12AM to 0 425 to help with late night highs  Repeat A1C and CMP before next visit  Continue to work on lifestyle modifications  Discussed back up options if pump fails again  She has lantus and Humalog if needed  Lab Results   Component Value Date    HGBA1C 6 5 (H) 01/05/2022            Relevant Orders    Comprehensive metabolic panel Lab Collect    HEMOGLOBIN A1C W/ EAG ESTIMATION Lab Collect       Other    Insulin pump in place          Orders Placed This Encounter   Procedures    Comprehensive metabolic panel Lab Collect     This is a patient instruction: Patient fasting for 8 hours or longer recommended  Standing Status:   Future     Standing Expiration Date:   1/12/2023    HEMOGLOBIN A1C W/ EAG ESTIMATION Lab Collect     Standing Status:   Future     Standing Expiration Date:   1/12/2023       Patient Instructions   If pump failure - use 12 units of lantus once daily     Change basal dose 9pm - 2am -  425 units from 0 40 units       Discussed with the patient and all questioned fully answered  She will call me if any problems arise      SANTANA Vickers

## 2022-01-12 NOTE — ASSESSMENT & PLAN NOTE
A1C at goal  BGs in good control  Increase basal rate from 9PM-12AM to 0 425 to help with late night highs  Repeat A1C and CMP before next visit  Continue to work on lifestyle modifications  Discussed back up options if pump fails again  She has lantus and Humalog if needed     Lab Results   Component Value Date    HGBA1C 6 5 (H) 01/05/2022

## 2022-01-18 DIAGNOSIS — E10.9 TYPE 1 DIABETES MELLITUS WITHOUT COMPLICATION (HCC): ICD-10-CM

## 2022-01-19 ENCOUNTER — TELEPHONE (OUTPATIENT)
Dept: ENDOCRINOLOGY | Facility: CLINIC | Age: 27
End: 2022-01-19

## 2022-01-19 RX ORDER — BLOOD SUGAR DIAGNOSTIC
STRIP MISCELLANEOUS
Qty: 900 STRIP | Refills: 1 | Status: SHIPPED | OUTPATIENT
Start: 2022-01-19

## 2022-01-19 NOTE — TELEPHONE ENCOUNTER
Pt is using the medtronic pump and her countour next strip are not cover by her insurance and we will need to get a PA for this    Pt Will sent a copy of her prescription plan via [employer]

## 2022-01-28 NOTE — TELEPHONE ENCOUNTER
Called [facility] pharmacy , I was able to get her new prescription plan Info  ID# [id number]  BIN # [id number]  PA approved  Bahman Santamaria Key: [id number] - PA Case ID: [id number]  Need help? Outcome   Approvedtoday  CaseId:[id number];Status:Approved; Review Type:Prior Auth; Coverage Start Date:12/29/2021; Coverage End Date:01/28/2023;

## 2022-02-02 DIAGNOSIS — E10.9 TYPE 1 DIABETES MELLITUS WITHOUT COMPLICATION (HCC): Primary | ICD-10-CM

## 2022-02-21 DIAGNOSIS — E10.9 TYPE 1 DIABETES MELLITUS WITHOUT COMPLICATION (HCC): Primary | ICD-10-CM

## 2022-02-21 RX ORDER — BLOOD SUGAR DIAGNOSTIC
STRIP MISCELLANEOUS
Qty: 600 EACH | Refills: 3 | Status: SHIPPED | OUTPATIENT
Start: 2022-02-21

## 2022-02-21 NOTE — TELEPHONE ENCOUNTER
Refill  Patient no longer using Contour test strips due to change in insurance, now needs Onetouch ultra verio        01/12/22 Geraline Pro  04/26/22 Geraline Pro

## 2022-02-21 NOTE — TELEPHONE ENCOUNTER
I approved, but since she is using Medtronic 670G pump we should be able to get these approved if she wants since they link to her pump     If she would prefer to use the verio that is OK

## 2022-04-14 ENCOUNTER — OFFICE VISIT (OUTPATIENT)
Dept: FAMILY MEDICINE CLINIC | Facility: MEDICAL CENTER | Age: 27
End: 2022-04-14
Payer: COMMERCIAL

## 2022-04-14 VITALS
HEART RATE: 74 BPM | BODY MASS INDEX: 21.2 KG/M2 | SYSTOLIC BLOOD PRESSURE: 116 MMHG | WEIGHT: 101 LBS | DIASTOLIC BLOOD PRESSURE: 78 MMHG | HEIGHT: 58 IN | OXYGEN SATURATION: 100 %

## 2022-04-14 DIAGNOSIS — Z00.00 ROUTINE ADULT HEALTH MAINTENANCE: Primary | ICD-10-CM

## 2022-04-14 DIAGNOSIS — E10.9 TYPE 1 DIABETES MELLITUS WITHOUT COMPLICATION (HCC): ICD-10-CM

## 2022-04-14 PROCEDURE — 3078F DIAST BP <80 MM HG: CPT | Performed by: FAMILY MEDICINE

## 2022-04-14 PROCEDURE — 99395 PREV VISIT EST AGE 18-39: CPT | Performed by: FAMILY MEDICINE

## 2022-04-14 PROCEDURE — 3008F BODY MASS INDEX DOCD: CPT | Performed by: FAMILY MEDICINE

## 2022-04-14 PROCEDURE — 3725F SCREEN DEPRESSION PERFORMED: CPT | Performed by: FAMILY MEDICINE

## 2022-04-14 PROCEDURE — 1036F TOBACCO NON-USER: CPT | Performed by: FAMILY MEDICINE

## 2022-04-14 PROCEDURE — 3074F SYST BP LT 130 MM HG: CPT | Performed by: FAMILY MEDICINE

## 2022-04-14 NOTE — PROGRESS NOTES
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  She has been doing very well  Her diabetes has been well controlled  Sees endocrinology at [facility]  Last A1c was 6 5  She saw gyn for her routine checkup December  Review of Systems   Respiratory: Negative for cough, chest tightness and shortness of breath  Cardiovascular: Negative for chest pain and palpitations  Skin: Negative for rash  Neurological: Negative for dizziness  O: /78 (BP Location: Left arm, Patient Position: Sitting, Cuff Size: Adult)   Pulse 74   Ht 4' 10" (1 473 m)   Wt 45 8 kg (101 lb)   SpO2 100%   BMI 21 11 kg/m²    Physical Exam  Constitutional:       Appearance: She is well-developed  HENT:      Head: Normocephalic  Right Ear: Tympanic membrane and external ear normal       Left Ear: Tympanic membrane and external ear normal       Nose: Nose normal    Eyes:      General: No scleral icterus  Conjunctiva/sclera: Conjunctivae normal       Pupils: Pupils are equal, round, and reactive to light  Neck:      Thyroid: No thyroid mass or thyromegaly  Vascular: No carotid bruit  Cardiovascular:      Rate and Rhythm: Normal rate and regular rhythm  Pulses:           Femoral pulses are 2+ on the right side and 2+ on the left side  Popliteal pulses are 2+ on the right side and 2+ on the left side  Dorsalis pedis pulses are 2+ on the right side and 2+ on the left side  Posterior tibial pulses are 2+ on the right side and 2+ on the left side  Heart sounds: Normal heart sounds  Pulmonary:      Effort: Pulmonary effort is normal  No respiratory distress  Breath sounds: Normal breath sounds  No wheezing or rales  Chest:   Breasts:      Right: No supraclavicular adenopathy        Left: No supraclavicular adenopathy  Abdominal:      General: Bowel sounds are normal  There is no distension  Palpations: Abdomen is soft  There is no mass  Tenderness: There is no abdominal tenderness  Musculoskeletal:         General: Normal range of motion  Cervical back: Normal range of motion and neck supple  Lymphadenopathy:      Head:      Right side of head: No submandibular or occipital adenopathy  Left side of head: No submandibular or occipital adenopathy  Cervical: No cervical adenopathy  Upper Body:      Right upper body: No supraclavicular adenopathy  Left upper body: No supraclavicular adenopathy  Skin:     Findings: No lesion or rash  Neurological:      Mental Status: She is oriented to person, place, and time  Psychiatric:         Behavior: Behavior normal      Assessment  1  Health maintenance-up-to-date with immunizations including COVID vaccine with booster  She declines pneumonia shots for now  2  IDDM-well controlled    Plan  As above  We discussed [place] to a new PCP [place]

## 2022-05-27 ENCOUNTER — OFFICE VISIT (OUTPATIENT)
Dept: DERMATOLOGY | Facility: CLINIC | Age: 27
End: 2022-05-27
Payer: COMMERCIAL

## 2022-05-27 VITALS — WEIGHT: 102 LBS | HEIGHT: 58 IN | BODY MASS INDEX: 21.41 KG/M2 | TEMPERATURE: 97.6 F

## 2022-05-27 DIAGNOSIS — B36.0 TINEA VERSICOLOR: Primary | ICD-10-CM

## 2022-05-27 PROCEDURE — 99203 OFFICE O/P NEW LOW 30 MIN: CPT | Performed by: DERMATOLOGY

## 2022-05-27 PROCEDURE — 3008F BODY MASS INDEX DOCD: CPT | Performed by: FAMILY MEDICINE

## 2022-05-27 RX ORDER — FLUCONAZOLE 150 MG/1
150 TABLET ORAL ONCE
Qty: 1 TABLET | Refills: 1 | Status: SHIPPED | OUTPATIENT
Start: 2022-05-27 | End: 2022-05-27

## 2022-05-27 RX ORDER — KETOCONAZOLE 20 MG/ML
1 SHAMPOO TOPICAL 2 TIMES WEEKLY
Qty: 120 ML | Refills: 2 | Status: SHIPPED | OUTPATIENT
Start: 2022-05-30 | End: 2022-09-09

## 2022-05-27 NOTE — PROGRESS NOTES
[facility] Note     Patient Name: Alondra Maldonado  Encounter Date: 05/27/2022     Have you been cared for by a [facility] Dermatologist in the last 3 years and, if so, which one? No    · Have you traveled outside of the [address] [place] [address] in the past 3 months or outside of the [facility] area in the last 2 weeks? No     May we call your Preferred Phone number to discuss your specific medical information? Yes     May we leave a detailed message that includes your specific medical information? Yes      Today's Chief Concerns:   Concern #1:  Discoloration    Concern #2:      Past Medical History:  Have you personally ever had or currently have any of the following? · Skin cancer (such as Melanoma, Basal Cell Carcinoma, Squamous Cell Carcinoma? (If Yes, please provide more detail)- No  · Eczema: No  · Psoriasis: No  · HIV/AIDS: No  · Hepatitis B or C: No  · Tuberculosis: No  · Systemic Immunosuppression such as Diabetes, Biologic or Immunotherapy, Chemotherapy, Organ Transplantation, Bone Marrow Transplantation (If YES, please provide more detail): YES, diabetes   · Radiation Treatment (If YES, please provide more detail): No  · Any other major medical conditions/concerns? (If Yes, which types)- No    Social History:     What is/was your primary occupation? teacher         Family History:  Have any of your "first degree relatives" (parent, brother, sister, or child) had any of the following       · Skin cancer such as Melanoma or Merkel Cell Carcinoma or Pancreatic Cancer? No  · Eczema, Asthma, Hay Fever or Seasonal Allergies: No  · Psoriasis or Psoriatic Arthritis: No  · Do any other medical conditions seem to run in your family? If Yes, what condition and which relatives?   No    Current Medications:   (please update all dermatological medications before printing patient's AVS!)      Current Outpatient Medications:     acetone, urine, test strip, 100 strips by Does not apply route as needed for high blood sugar, Disp: 100 each, Rfl: 3    Baqsimi Two Pack 3 MG/DOSE POWD, 3 mg into each nostril as needed (hypoglycemic emergency) Baqsimi, brand necessary, Disp: 2 each, Rfl: 1    Contour Next Test test strip, TEST 8 TO 10 TIMES A DAY AS INSTRUCTED, Disp: 900 strip, Rfl: 1    glucose blood (OneTouch Verio) test strip, Use to test 8-10 x daily, Disp: 600 each, Rfl: 3    HumaLOG 100 UNIT/ML injection, Inject 30 units daily via pump, Disp: 30 mL, Rfl: 1    norethindrone (MICRONOR) 0 35 MG tablet, TAKE 1 TABLET BY MOUTH EVERY DAY, Disp: 84 tablet, Rfl: 4    insulin glargine (Lantus SoloStar) 100 units/mL injection pen, Use 12 units before bed in case pump failure (Patient not taking: No sig reported), Disp: 15 mL, Rfl: 3    insulin lispro (HumaLOG KwikPen) 100 units/mL injection pen, Inject incase of pump failure (Patient not taking: No sig reported), Disp: 15 mL, Rfl: 3    Insulin Syringes, Disposable, U-100 1 ML MISC, Using syringe daily to fill insulin cartridge as directed, Disp: 30 each, Rfl: 6      Review of Systems:  Have you recently had or currently have any of the following? If YES, what are you doing for the problem? · Fever, chills or unintended weight loss: No  · Sudden loss or change in your vision: No  · Nausea, vomiting or blood in your stool: No  · Painful or swollen joints: No  · Wheezing or cough: No  · Changing mole or non-healing wound: No  · Nosebleeds: No  · Excessive sweating: No  · Easy or prolonged bleeding? No  · Over the last 2 weeks, how often have you been bothered by the following problems? · Taking little interest or pleasure in doing things: 1 - Not at All  · Feeling down, depressed, or hopeless: 1 - Not at All  · Rapid heartbeat with epinephrine:  No    · FEMALES ONLY:    · Are you pregnant or planning to become pregnant? No  · Are you currently or planning to be nursing or breast feeding? No    · Any known allergies?       No Known Allergies      Physical Exam:     Was a chaperone (Derm Clinical Assistant) present throughout the entire Physical Exam? Yes    o     CONSTITUTIONAL:   Vitals:    05/27/22 1412   Temp: 97 6 °F (36 4 °C)   TempSrc: Temporal   Weight: 46 3 kg (102 lb)   Height: 4' 10" (1 473 m)       PSYCH: Normal mood and affect  EYES: Normal conjunctiva  ENT: Normal lips and oral mucosa  CARDIOVASCULAR: No edema  RESPIRATORY: Normal respirations  HEME/LYMPH/IMMUNO:  No regional lymphadenopathy except as noted below in "ASSESSMENT AND PLAN BY DIAGNOSIS"    SKIN:  FULL ORGAN SYSTEM EXAM   Hair, Scalp, Ears, Face Normal except as noted below in Assessment   Neck, Cervical Chain Nodes Normal except as noted below in Assessment   Right Arm/Hand/Fingers Normal except as noted below in Assessment   Left Arm/Hand/Fingers Normal except as noted below in Assessment   Chest/Breasts/Axillae Viewed areas Normal except as noted below in Assessment   Abdomen, Umbilicus Normal except as noted below in Assessment   Back/Spine Normal except as noted below in Assessment   Groin/Genitalia/Buttocks    Right Leg, Foot, Toes    Left Leg, Foot, Toes         Assessment and Plan by Diagnosis:    History of Present Condition:     Duration:  How long has this been an issue for you?    o  at least 2 summers    Location Affected:  Where on the body is this affecting you?    o  abdomen, upper chest    Quality:  Is there any bleeding, pain, itch, burning/irritation, or redness associated with the skin lesion? o  denies    Severity:  Describe any bleeding, pain, itch, burning/irritation, or redness on a scale of 1 to 10 (with 10 being the worst)    o  denies    Timing:  Does this condition seem to be there pretty constantly or do you notice it more at specific times throughout the day?    o  appears more during the summer months    Context:  Have you ever noticed that this condition seems to be associated with specific activities you do?    o  denies  Modifying Factors:    o Anything that seems to make the condition worse?    -  hot weather and hot water   o What have you tried to do to make the condition better?    -  denies    Associated Signs and Symptoms:  Does this skin lesion seem to be associated with any of the following:  o  SL AMB DERM SIGNS AND SYMPTOMS: Skin color changes     TINEA VERSICOLOR    Physical Exam:   Anatomic Location Affected:  Upper chest, abdomen    Morphological Description:  Oval orange scaly macules    Pertinent Positives:   Pertinent Negatives: Additional History of Present Condition:  Appears more during the warm months     Assessment and Plan:  Based on a thorough discussion of this condition and the management approach to it (including a comprehensive discussion of the known risks, side effects and potential benefits of treatment), the patient (family) agrees to implement the following specific plan:   Start Diflucan 150 mg by mouth once per week for 2 weeks    Start ciclopirox topically once a day for 2 weeks to chest and abdomen    Start ketoconazole shampoo 2% twice a week to affected areas indefinitely     What is tinea versicolor? Tinea versicolor or pityriasis versicolor is a type of fungal infection on the skin due to a yeast that lives on all of us  It Is due an overgrowth of a type of yeast called Malassezia furfur, which feeds on oils in the skin and thrives in warm, humid environments  Anyone can develop tinea versicolor, but it is more common during the summer months and in tropical climates  Those who tend to sweat more heavily are also at higher risk  Although it is not considered infectious, multiple family members can be affected  - Teens and young adults are most susceptible due to having oily skin   - Affects people of all skin colors   - Weakened immune system predisposes to development     What are the clinical symptoms of tinea versicolor?    The first sign of tinea versicolor is often spots on the skin  They can be lighter or darker than surrounding skin, with colors ranging from white, pink, tan, to brown  - The spots are dry, scaly, and sometimes itchy   - Can appear anywhere on the body, but more commonly over the neck, trunk, and arms   - Spots can grow together forming larger patches   - May disappear when temperature drops and return once it becomes warm again  - Pale spots can be confused with vitiligo    How do we diagnose tinea versicolor? Tinea versicolor is usually diagnosed with a history and physical examination  However, the following tests may be useful for confirmation when in doubt  - Wood lamp (black light) examination-- yellow-green glow may be observed in affected areas  - Microscopy using potassium hydroxide (KOH) to examine skin scrapings  - Fungal culture--this is usually reported to be negative, as it is quite difficult to persuade the yeasts to grow in a laboratory  - Skin biopsy--fungal elements may be seen within the outer cells of the skin (stratum corneum) on histopathology    How do we treat tinea versicolor? There are many different options to treat tinea versicolor  The treatment chosen may depend on how thick the spots have grown and how much of the body has been affected  Mild tinea versicolor can be treated with primarily topical antifungal agents  These include:  - Ketoconazole cream/shampoo  - Selenium sulfide   - Terbinafine gel   - Ciclopirox cream/solution   - Propylene glycol solution   - Sodium thiosulphate solution   Topical medications should be applied widely to affected areas before bedtime for between three days to two weeks depending on your dermatologists recommendation    - Use of medicated cleansers once or twice a month may prevent recurrence in those who have has multiple bouts of yeast overgrowth     For extensive skin involvement or after failure of topical medications, oral antifungal agents such as itraconazole and fluconazole can be used  Oral terbinafine used to treat dermatophyte infections is not effective against tinea versicolor    - Vigorous exercise an hour after taking the medication may help sweat it onto the skin surface and enhance clearance of the yeast    Scribe Attestation    I,:  Carlton Del Rosario am acting as a scribe while in the presence of the attending physician :       I,:  Luke Cornelius MD personally performed the services described in this documentation    as scribed in my presence :

## 2022-05-27 NOTE — PATIENT INSTRUCTIONS
TINEA VERSICOLOR    Assessment and Plan:  Based on a thorough discussion of this condition and the management approach to it (including a comprehensive discussion of the known risks, side effects and potential benefits of treatment), the patient (family) agrees to implement the following specific plan:  Start Diflucan 150 mg by mouth once per week for 2 weeks   Start ciclopirox topically once a day for 2 weeks to chest and abdomen   Start ketoconazole shampoo 2% twice a week to affected areas indefinitely     What is tinea versicolor? Tinea versicolor or pityriasis versicolor is a type of fungal infection on the skin due to a yeast that lives on all of us  It Is due an overgrowth of a type of yeast called Malassezia furfur, which feeds on oils in the skin and thrives in warm, humid environments  Anyone can develop tinea versicolor, but it is more common during the summer months and in tropical climates  Those who tend to sweat more heavily are also at higher risk  Although it is not considered infectious, multiple family members can be affected     Teens and young adults are most susceptible due to having oily skin   Affects people of all skin colors   Weakened immune system predisposes to development

## 2022-06-10 ENCOUNTER — APPOINTMENT (OUTPATIENT)
Dept: LAB | Facility: IMAGING CENTER | Age: 27
End: 2022-06-10
Payer: COMMERCIAL

## 2022-06-10 DIAGNOSIS — E10.9 TYPE 1 DIABETES MELLITUS WITHOUT COMPLICATION (HCC): ICD-10-CM

## 2022-06-10 LAB
ALBUMIN SERPL BCP-MCNC: 3.8 G/DL (ref 3.5–5)
ALP SERPL-CCNC: 59 U/L (ref 46–116)
ALT SERPL W P-5'-P-CCNC: 19 U/L (ref 12–78)
ANION GAP SERPL CALCULATED.3IONS-SCNC: 1 MMOL/L (ref 4–13)
AST SERPL W P-5'-P-CCNC: 17 U/L (ref 5–45)
BILIRUB SERPL-MCNC: 0.28 MG/DL (ref 0.2–1)
BUN SERPL-MCNC: 13 MG/DL (ref 5–25)
CALCIUM SERPL-MCNC: 9 MG/DL (ref 8.3–10.1)
CHLORIDE SERPL-SCNC: 110 MMOL/L (ref 100–108)
CO2 SERPL-SCNC: 29 MMOL/L (ref 21–32)
CREAT SERPL-MCNC: 0.72 MG/DL (ref 0.6–1.3)
EST. AVERAGE GLUCOSE BLD GHB EST-MCNC: 137 MG/DL
GFR SERPL CREATININE-BSD FRML MDRD: 115 ML/MIN/1.73SQ M
GLUCOSE P FAST SERPL-MCNC: 129 MG/DL (ref 65–99)
HBA1C MFR BLD: 6.4 %
POTASSIUM SERPL-SCNC: 3.7 MMOL/L (ref 3.5–5.3)
PROT SERPL-MCNC: 7.3 G/DL (ref 6.4–8.2)
SODIUM SERPL-SCNC: 140 MMOL/L (ref 136–145)

## 2022-06-10 PROCEDURE — 83036 HEMOGLOBIN GLYCOSYLATED A1C: CPT

## 2022-06-10 PROCEDURE — 80053 COMPREHEN METABOLIC PANEL: CPT

## 2022-06-10 PROCEDURE — 3044F HG A1C LEVEL LT 7.0%: CPT

## 2022-06-10 PROCEDURE — 36415 COLL VENOUS BLD VENIPUNCTURE: CPT

## 2022-06-13 ENCOUNTER — OFFICE VISIT (OUTPATIENT)
Dept: ENDOCRINOLOGY | Facility: CLINIC | Age: 27
End: 2022-06-13
Payer: COMMERCIAL

## 2022-06-13 VITALS
DIASTOLIC BLOOD PRESSURE: 82 MMHG | BODY MASS INDEX: 21.79 KG/M2 | HEART RATE: 68 BPM | SYSTOLIC BLOOD PRESSURE: 120 MMHG | WEIGHT: 103.8 LBS | HEIGHT: 58 IN

## 2022-06-13 DIAGNOSIS — E10.9 TYPE 1 DIABETES MELLITUS WITHOUT COMPLICATION (HCC): Primary | ICD-10-CM

## 2022-06-13 DIAGNOSIS — Z96.41 INSULIN PUMP IN PLACE: ICD-10-CM

## 2022-06-13 PROCEDURE — 95251 CONT GLUC MNTR ANALYSIS I&R: CPT

## 2022-06-13 PROCEDURE — 3074F SYST BP LT 130 MM HG: CPT

## 2022-06-13 PROCEDURE — 3079F DIAST BP 80-89 MM HG: CPT

## 2022-06-13 PROCEDURE — 3008F BODY MASS INDEX DOCD: CPT

## 2022-06-13 PROCEDURE — 99214 OFFICE O/P EST MOD 30 MIN: CPT

## 2022-06-13 NOTE — PROGRESS NOTES
Established Patient Progress Note      Chief Complaint   Patient presents with    Diabetes Type 1        History of Present Illness:   Danella Bence is a 32 y o  female with type 1 diabetes with long term use of insulin since June 2018  Last seen in the office 1/12/21  Denies complications of diabetes  Last A1C was 6 4  She is on Medtronic 670G pump and prefers to use manual mode vs auto mode  Denies recent illness or hospitalizations  Denies recent severe hypoglycemic or severe hyperglycemic episodes  Denies any issues with her current regimen  Home glucose monitoring: are performed regularly using CGM       Patient is on a Medtronic 670G pump prescribed by endocrinologist  She has been on a pump since 10/29/18  She is in manual mode 100% of the time  She denies any issues or malfunctioning  Current Insulin pump settings:  Basal rate: 12 am 0 775, 6:30 am 0 4, 12 pm 0 40, 9 pm 0 5  Insulin to carb ratio:12am 10, 6:30am 8 0, 3pm 7 8  Insulin sensitivity factor: 60  BG target: 120  Active Insulin time: 4 hours     Type of insulin:  Humalog    Backup Plan: patient aware that in case of malfunctioning of the pump or unexplained hyperglycemia to use basal and bolus therapy as backup which is prescribed to the patient  Also notified patient to call clinic and/or pump company if any issues or go to emergency department if signs/symptoms of DKA      Danella Bence   Device used: Medtronic guardian  Home use   Indication: Type 1 Diabetes  More than 72 hours of data was reviewed  Report to be scanned to chart     Date Range: 5/31/22-6/13/22  Analysis of data:   Average Glucose: 159  SD : 49   Time in Target Range: 69%   Time Above Range: 27% high, 4% very high   Time Below Range: 0%   Interpretation of data: BGs well controlled, having some hyperglycemia between 12pm-6pm      Last Eye Exam: 11/1/21, UTD  Last Foot Exam: 9/22/21, UTD    Patient Active Problem List   Diagnosis    Breakthrough bleeding    Classic migraine with aura    Migraine headache    Tension type headache    Type 1 diabetes mellitus without complication (HCC)    Insulin pump in place    Environmental and seasonal allergies    Encounter for gynecological examination    Encounter for counseling regarding contraception      Past Medical History:   Diagnosis Date    Diabetes mellitus ([facility] [address] )     type 1    Migraine       Past Surgical History:   Procedure Laterality Date    MOUTH SURGERY  2012    TOOTH EXTRACTION      WISDOM TOOTH EXTRACTION        Family History   Problem Relation Age of Onset    Hypertension Mother     No Known Problems Father     Cancer Maternal Grandmother     Hypertension Maternal Grandmother     Cancer Maternal Grandfather     Diabetes Maternal Grandfather     Heart disease Maternal Grandfather     Hypertension Maternal Grandfather     Diabetes Paternal Grandmother     Heart disease Paternal Grandmother     Hypertension Paternal Grandmother     Arthritis Other     Diabetes Other     Arthritis Other     Diabetes Other     Breast cancer Neg Hx     Colon cancer Neg Hx     Ovarian cancer Neg Hx      Social History     Tobacco Use    Smoking status: Never Smoker    Smokeless tobacco: Never Used   Substance Use Topics    Alcohol use: Yes     Comment: social      No Known Allergies      Current Outpatient Medications:     acetone, urine, test strip, 100 strips by Does not apply route as needed for high blood sugar, Disp: 100 each, Rfl: 3    Baqsimi Two Pack 3 MG/DOSE POWD, 3 mg into each nostril as needed (hypoglycemic emergency) Baqsimi, brand necessary, Disp: 2 each, Rfl: 1    ciclopirox (LOPROX) 0 77 % cream, Apply topically 2 (two) times a day for 14 days, Disp: 90 g, Rfl: 1    glucose blood (OneTouch Verio) test strip, Use to test 8-10 x daily, Disp: 600 each, Rfl: 3    HumaLOG 100 UNIT/ML injection, Inject 30 units daily via pump, Disp: 30 mL, Rfl: 1    Insulin Syringes, Disposable, U-100 1 ML MISC, Using syringe daily to fill insulin cartridge as directed, Disp: 30 each, Rfl: 6    ketoconazole (NIZORAL) 2 % shampoo, Apply 1 application topically 2 (two) times a week for 30 doses, Disp: 120 mL, Rfl: 2    norethindrone (MICRONOR) 0 35 MG tablet, TAKE 1 TABLET BY MOUTH EVERY DAY, Disp: 84 tablet, Rfl: 4    Contour Next Test test strip, TEST 8 TO 10 TIMES A DAY AS INSTRUCTED (Patient not taking: Reported on 6/13/2022), Disp: 900 strip, Rfl: 1    insulin glargine (Lantus SoloStar) 100 units/mL injection pen, Use 12 units before bed in case pump failure (Patient not taking: No sig reported), Disp: 15 mL, Rfl: 3    insulin lispro (HumaLOG KwikPen) 100 units/mL injection pen, Inject incase of pump failure (Patient not taking: No sig reported), Disp: 15 mL, Rfl: 3    Review of Systems   Constitutional: Negative for activity change, appetite change, chills, diaphoresis, fatigue, fever and unexpected weight change  Eyes: Negative for visual disturbance  Respiratory: Negative for chest tightness and shortness of breath  Cardiovascular: Negative for chest pain, palpitations and leg swelling  Gastrointestinal: Negative for abdominal pain, constipation, diarrhea, nausea and vomiting  Endocrine: Negative for polydipsia, polyphagia and polyuria  Genitourinary: Negative for frequency  Skin: Negative for color change, pallor, rash and wound  Neurological: Negative for dizziness, tremors, weakness, light-headedness and numbness  All other systems reviewed and are negative  Physical Exam:  Body mass index is 21 69 kg/m²  /82   Pulse 68   Ht 4' 10" (1 473 m)   Wt 47 1 kg (103 lb 12 8 oz)   BMI 21 69 kg/m²    Wt Readings from Last 3 Encounters:   06/13/22 47 1 kg (103 lb 12 8 oz)   05/27/22 46 3 kg (102 lb)   04/14/22 45 8 kg (101 lb)       Physical Exam  Vitals reviewed  Constitutional:       Appearance: Normal appearance  She is normal weight  HENT:      Head: Normocephalic  Cardiovascular:      Rate and Rhythm: Normal rate and regular rhythm  Pulses: Normal pulses  Heart sounds: Normal heart sounds  No murmur heard  Pulmonary:      Effort: Pulmonary effort is normal  No respiratory distress  Breath sounds: Normal breath sounds  No wheezing, rhonchi or rales  Musculoskeletal:      Right lower leg: No edema  Left lower leg: No edema  Skin:     General: Skin is warm and dry  Neurological:      Mental Status: She is alert and oriented to person, place, and time  Psychiatric:         Mood and Affect: Mood normal          Behavior: Behavior normal          Thought Content: Thought content normal          Judgment: Judgment normal        Labs:   Lab Results   Component Value Date    HGBA1C 6 4 (H) 06/10/2022    HGBA1C 6 5 (H) 01/05/2022    HGBA1C 6 1 (H) 09/18/2021     Lab Results   Component Value Date    CREATININE 0 72 06/10/2022    CREATININE 0 68 01/05/2022    CREATININE 0 55 (L) 09/18/2021    BUN 13 06/10/2022    K 3 7 06/10/2022     (H) 06/10/2022    CO2 29 06/10/2022     eGFR   Date Value Ref Range Status   06/10/2022 115 ml/min/1 73sq m Final     Lab Results   Component Value Date    HDL 67 09/18/2021    TRIG 29 09/18/2021     Lab Results   Component Value Date    ALT 19 06/10/2022    AST 17 06/10/2022    ALKPHOS 59 06/10/2022     Lab Results   Component Value Date    VBG7LJREADTV 2 410 12/11/2020    GXB4DUYVUEXB 1 540 05/28/2020    EEM6PKGJWISU 0 599 11/09/2019     Lab Results   Component Value Date    FREET4 1 03 12/11/2020       Impression & Plan:    Problem List Items Addressed This Visit        Endocrine    Type 1 diabetes mellitus without complication ([facility] [address] ) - Primary     HGA1C at goal  BGs well controlled, having some hyperglycemia in the afternoon after lunch and before dinner between 12pm-6pm    Treatment regimen: Changed carb ratio at 12pm-3pm from 8 to 7 8 and at 3pm from 7 8 to 7 5  Continue current basal rates   She would like to continue with medtronic sensor and pump at this time  Discussed other options in the future if she is interested  Discussed back up method incase of pump failure  Discussed risks/complications associated with uncontrolled diabetes  Advised to adhere to diabetic diet, and recommended staying active/exercising routinely  Keep carbohydrates consistent to limit blood glucose fluctuations  Advised to call if blood sugars less than 70 mg/dl or over 300 mg/dl  Continue with CGM  Discussed symptoms and treatment of hypoglycemia  Recommended routine follow-up with podiatry and ophthalmology  Ordered blood work to complete prior to next visit  Lab Results   Component Value Date    HGBA1C 6 4 (H) 06/10/2022              Relevant Orders    Lipid panel    Microalbumin / creatinine urine ratio    Hemoglobin A1C    Comprehensive metabolic panel       Other    Insulin pump in place          Orders Placed This Encounter   Procedures    Lipid panel     This is a patient instruction: This test requires patient fasting for 10-12 hours or longer  Drinking of black coffee or black tea is acceptable  Standing Status:   Future     Standing Expiration Date:   6/13/2023    Microalbumin / creatinine urine ratio     Standing Status:   Future     Standing Expiration Date:   6/13/2023    Hemoglobin A1C     Standing Status:   Future     Standing Expiration Date:   6/13/2023    Comprehensive metabolic panel     This is a patient instruction: Patient fasting for 8 hours or longer recommended  Standing Status:   Future     Standing Expiration Date:   6/13/2023       There are no Patient Instructions on file for this visit  Discussed with the patient and all questioned fully answered  She will call me if any problems arise      SANTANA Alicea

## 2022-06-13 NOTE — ASSESSMENT & PLAN NOTE
HGA1C at goal  BGs well controlled, having some hyperglycemia in the afternoon after lunch and before dinner between 12pm-6pm    Treatment regimen: Changed carb ratio at 12pm-3pm from 8 to 7 8 and at 3pm from 7 8 to 7 5  Continue current basal rates  She would like to continue with medtronic sensor and pump at this time  Discussed other options in the future if she is interested  Discussed back up method incase of pump failure  Discussed risks/complications associated with uncontrolled diabetes  Advised to adhere to diabetic diet, and recommended staying active/exercising routinely  Keep carbohydrates consistent to limit blood glucose fluctuations  Advised to call if blood sugars less than 70 mg/dl or over 300 mg/dl  Continue with CGM  Discussed symptoms and treatment of hypoglycemia  Recommended routine follow-up with podiatry and ophthalmology  Ordered blood work to complete prior to next visit    Lab Results   Component Value Date    HGBA1C 6 4 (H) 06/10/2022

## 2022-08-02 ENCOUNTER — TELEPHONE (OUTPATIENT)
Dept: ENDOCRINOLOGY | Facility: CLINIC | Age: 27
End: 2022-08-02

## 2022-08-02 NOTE — TELEPHONE ENCOUNTER
Pt called her bs have been running high   Advised to send bs log to email with list of meds, name and

## 2022-08-03 ENCOUNTER — TELEPHONE (OUTPATIENT)
Dept: ENDOCRINOLOGY | Facility: CLINIC | Age: 27
End: 2022-08-03

## 2022-08-03 NOTE — TELEPHONE ENCOUNTER
Called patient and discussed high BGs  She tested positive for COVID Saturday and has been running high ever since  Adjusted basal rates at 6:30 am-9pm to 0 44 and 9pm to 0 55  she tested urine for ketones and was low to moderate  She denies nausea or vomiting  She is feeling better today  Advised her to call office in the next 2 days if BGs remain elevated and advised her to test urine for ketones daily and worsens to call office

## 2022-08-06 DIAGNOSIS — E10.9 TYPE 1 DIABETES MELLITUS WITHOUT COMPLICATION (HCC): ICD-10-CM

## 2022-08-08 RX ORDER — INSULIN LISPRO 100 [IU]/ML
INJECTION, SOLUTION INTRAVENOUS; SUBCUTANEOUS
Qty: 30 ML | Refills: 3 | Status: SHIPPED | OUTPATIENT
Start: 2022-08-08

## 2022-08-24 ENCOUNTER — VBI (OUTPATIENT)
Dept: ADMINISTRATIVE | Facility: OTHER | Age: 27
End: 2022-08-24

## 2022-09-06 ENCOUNTER — TELEPHONE (OUTPATIENT)
Dept: ENDOCRINOLOGY | Facility: CLINIC | Age: 27
End: 2022-09-06

## 2022-09-20 ENCOUNTER — APPOINTMENT (OUTPATIENT)
Dept: LAB | Facility: IMAGING CENTER | Age: 27
End: 2022-09-20
Payer: COMMERCIAL

## 2022-09-20 NOTE — PROGRESS NOTES
Established Patient Progress Note      Chief Complaint   Patient presents with    Diabetes Type 1        History of Present Illness:   Kenji Parkinson a 32 y o  female with type 1 diabetes with long term use of insulin since June 2018  Last seen in the office 6/13/22  Denies complications of diabetes  Last A1C was 6 5  She is on Medtronic 670G pump and prefers to use manual mode vs auto mode  Denies recent illness or hospitalizations  Denies recent severe hypoglycemic or severe hyperglycemic episodes  Denies any issues with her current regimen  Home glucose monitoring: are performed regularly using CGM        Patient is on a Medtronic 670G pump prescribed by endocrinologist  She has been on a pump since 10/29/18  She is in manual mode 100% of the time  She denies any issues or malfunctioning       Current Insulin pump settings:  Basal rate: 12 am 0 925, 6:30 am 0 5, 12 pm 0 525, 9 pm 0 65  Insulin to carb ratio:12am 9 5, 6:30am 7 4, 12pm 7 4, 3pm 7 2  Insulin sensitivity factor: 60  BG target: 120  Active Insulin time: 4 hours    Backup Plan: patient aware that in case of malfunctioning of the pump or unexplained hyperglycemia to use basal and bolus therapy as backup which is prescribed to the patient  Also notified patient to call clinic and/or pump company if any issues or go to emergency department if signs/symptoms of DKA        Yesika Del Angel   Device used: Tangoe guardian   Home use   Indication: Type 1 Diabetes  More than 72 hours of data was reviewed  Report to be scanned to chart     Date Range: 9/8/22-9/21/22  Analysis of data:   Average Glucose: 146  Coefficient of Variation: 33 1%   SD : 48   Time in Target Range: 78%   Time Above Range: 18% high, 3% very high    Time Below Range: 1%   Interpretation of data: BGs well controlled, having some hyperglycemia at night before bed       Last Eye Exam: 11/1/21, UTD  Last Foot Exam: done today     Patient Active Problem List   Diagnosis    Breakthrough bleeding    Classic migraine with aura    Migraine headache    Tension type headache    Type 1 diabetes mellitus without complication (HCC)    Insulin pump in place    Environmental and seasonal allergies    Encounter for gynecological examination    Encounter for counseling regarding contraception      Past Medical History:   Diagnosis Date    Diabetes mellitus ([facility] [address] )     type 1    Migraine       Past Surgical History:   Procedure Laterality Date    MOUTH SURGERY  2012    TOOTH EXTRACTION      WISDOM TOOTH EXTRACTION        Family History   Problem Relation Age of Onset    Hypertension Mother     No Known Problems Father     Cancer Maternal Grandmother     Hypertension Maternal Grandmother     Cancer Maternal Grandfather     Diabetes Maternal Grandfather     Heart disease Maternal Grandfather     Hypertension Maternal Grandfather     Diabetes Paternal Grandmother     Heart disease Paternal Grandmother     Hypertension Paternal Grandmother     Arthritis Other     Diabetes Other     Arthritis Other     Diabetes Other     Breast cancer Neg Hx     Colon cancer Neg Hx     Ovarian cancer Neg Hx      Social History     Tobacco Use    Smoking status: Never Smoker    Smokeless tobacco: Never Used   Substance Use Topics    Alcohol use: Yes     Comment: social      No Known Allergies      Current Outpatient Medications:     acetone, urine, test strip, 100 strips by Does not apply route as needed for high blood sugar, Disp: 100 each, Rfl: 3    Baqsimi Two Pack 3 MG/DOSE POWD, 3 mg into each nostril as needed (hypoglycemic emergency) Baqsimi, brand necessary, Disp: 2 each, Rfl: 1    glucose blood (OneTouch Verio) test strip, Use to test 8-10 x daily, Disp: 600 each, Rfl: 3    HumaLOG 100 UNIT/ML injection, INJECT 30 UNITS DAILY VIA PUMP, Disp: 30 mL, Rfl: 3    Insulin Syringes, Disposable, U-100 1 ML MISC, Using syringe daily to fill insulin cartridge as directed, Disp: 30 each, Rfl: 6    norethindrone (MICRONOR) 0 35 MG tablet, TAKE 1 TABLET BY MOUTH EVERY DAY, Disp: 84 tablet, Rfl: 4    ciclopirox (LOPROX) 0 77 % cream, Apply topically 2 (two) times a day for 14 days (Patient not taking: Reported on 9/21/2022), Disp: 90 g, Rfl: 1    Contour Next Test test strip, TEST 8 TO 10 TIMES A DAY AS INSTRUCTED (Patient not taking: No sig reported), Disp: 900 strip, Rfl: 1    insulin glargine (Lantus SoloStar) 100 units/mL injection pen, Use 12 units before bed in case pump failure (Patient not taking: No sig reported), Disp: 15 mL, Rfl: 3    insulin lispro (HumaLOG KwikPen) 100 units/mL injection pen, Inject incase of pump failure (Patient not taking: No sig reported), Disp: 15 mL, Rfl: 3    ketoconazole (NIZORAL) 2 % shampoo, Apply 1 application topically 2 (two) times a week for 30 doses (Patient not taking: Reported on 9/21/2022), Disp: 120 mL, Rfl: 2    Review of Systems   Constitutional: Negative for activity change, appetite change, chills, diaphoresis, fatigue, fever and unexpected weight change  Eyes: Negative for visual disturbance  Respiratory: Negative for chest tightness and shortness of breath  Cardiovascular: Negative for chest pain, palpitations and leg swelling  Gastrointestinal: Negative for abdominal pain, constipation, diarrhea, nausea and vomiting  Endocrine: Negative for polydipsia, polyphagia and polyuria  Skin: Negative for color change, pallor, rash and wound  Neurological: Negative for dizziness, tremors, weakness, light-headedness and numbness  All other systems reviewed and are negative  Physical Exam:  Body mass index is 22 02 kg/m²    /70 (BP Location: Left arm, Patient Position: Sitting, Cuff Size: Standard)   Pulse 78   Ht 4' 10" (1 473 m)   Wt 47 8 kg (105 lb 6 oz)   BMI 22 02 kg/m²    Wt Readings from Last 3 Encounters:   09/21/22 47 8 kg (105 lb 6 oz)   06/13/22 47 1 kg (103 lb 12 8 oz)   05/27/22 46 3 kg (102 lb) Physical Exam  Vitals reviewed  Constitutional:       Appearance: Normal appearance  HENT:      Head: Normocephalic  Cardiovascular:      Rate and Rhythm: Normal rate and regular rhythm  Pulses: Normal pulses  no weak pulses          Dorsalis pedis pulses are 2+ on the right side and 2+ on the left side  Heart sounds: Normal heart sounds  No murmur heard  Pulmonary:      Effort: Pulmonary effort is normal  No respiratory distress  Breath sounds: Normal breath sounds  No wheezing, rhonchi or rales  Musculoskeletal:      Right lower leg: No edema  Left lower leg: No edema  Feet:      Right foot:      Skin integrity: No ulcer, skin breakdown, erythema, warmth, callus or dry skin  Left foot:      Skin integrity: No ulcer, skin breakdown, erythema, warmth, callus or dry skin  Skin:     General: Skin is warm and dry  Neurological:      Mental Status: She is alert and oriented to person, place, and time  Psychiatric:         Mood and Affect: Mood normal          Behavior: Behavior normal          Thought Content: Thought content normal          Judgment: Judgment normal        Patient's shoes and socks removed  Right Foot/Ankle   Right Foot Inspection  Skin Exam: skin normal and skin intact  No dry skin, no warmth, no callus, no erythema, no maceration, no abnormal color, no pre-ulcer, no ulcer and no callus  Toe Exam: ROM and strength within normal limits  No swelling, no tenderness, erythema and  no right toe deformity    Sensory   Vibration: intact  Monofilament testing: intact    Vascular  Capillary refills: < 3 seconds  The right DP pulse is 2+  Left Foot/Ankle  Left Foot Inspection  Skin Exam: skin normal and skin intact  No dry skin, no warmth, no erythema, no maceration, normal color, no pre-ulcer, no ulcer and no callus  Toe Exam: ROM and strength within normal limits  No swelling, no tenderness, no erythema and no left toe deformity       Sensory Vibration: intact  Monofilament testing: intact    Vascular  Capillary refills: < 3 seconds  The left DP pulse is 2+  Assign Risk Category  No deformity present  No loss of protective sensation  No weak pulses  Risk: 0      Labs:   Lab Results   Component Value Date    HGBA1C 6 5 (H) 09/20/2022    HGBA1C 6 4 (H) 06/10/2022    HGBA1C 6 5 (H) 01/05/2022     Lab Results   Component Value Date    CREATININE 0 66 09/20/2022    CREATININE 0 72 06/10/2022    CREATININE 0 68 01/05/2022    BUN 9 09/20/2022    K 3 7 09/20/2022     09/20/2022    CO2 25 09/20/2022     eGFR   Date Value Ref Range Status   09/20/2022 122 ml/min/1 73sq m Final     Lab Results   Component Value Date    HDL 58 09/20/2022    TRIG 26 09/20/2022     Lab Results   Component Value Date    ALT 17 09/20/2022    AST 10 09/20/2022    ALKPHOS 51 09/20/2022     Lab Results   Component Value Date    VXW1ZSBBELDV 2 410 12/11/2020    FGD2WXLFUZVP 1 540 05/28/2020    GVW1LLHVMYIT 0 599 11/09/2019     Lab Results   Component Value Date    FREET4 1 03 12/11/2020       Impression & Plan:    Problem List Items Addressed This Visit        Endocrine    Type 1 diabetes mellitus without complication (HCC) - Primary     HGA1C at goal, BGs well controlled, some hyperglycemia at night after dinner/before bed  Treatment regimen: Changed carb ratio to 6 8 g/1 unit from 7 2 at 3pm due to hyperglycemia after dinner  Continue other insulin pump settings  She is leaving for [facility] to [place] and has adequate back up supplies in case of pump failure  Discussed risks/complications associated with uncontrolled diabetes  Advised to adhere to diabetic diet, and recommended staying active/exercising routinely  Keep carbohydrates consistent to limit blood glucose fluctuations  Advised to call if blood sugars less than 70 mg/dl or over 300 mg/dl  Continue with CGM  Discussed symptoms and treatment of hypoglycemia      Recommended routine follow-up with ophthalmology  Ordered blood work to complete prior to next visit  Lab Results   Component Value Date    HGBA1C 6 5 (H) 09/20/2022            Relevant Orders    Ambulatory referral to Ophthalmology    Comprehensive metabolic panel    Hemoglobin A1C       Other    Insulin pump in place          Orders Placed This Encounter   Procedures    Comprehensive metabolic panel     This is a patient instruction: Patient fasting for 8 hours or longer recommended  Standing Status:   Future     Standing Expiration Date:   9/21/2023    Hemoglobin A1C     Standing Status:   Future     Standing Expiration Date:   9/21/2023    Ambulatory referral to Ophthalmology     Standing Status:   Future     Standing Expiration Date:   9/21/2023     Referral Priority:   Routine     Referral Type:   Consult - AMB     Referral Reason:   Specialty Services Required     Referred to Provider:   Alondra Amador     Requested Specialty:   Optometry     Number of Visits Requested:   1     Expiration Date:   9/21/2023       There are no Patient Instructions on file for this visit  Discussed with the patient and all questioned fully answered  She will call me if any problems arise      SANTANA Resendiz

## 2022-09-21 ENCOUNTER — OFFICE VISIT (OUTPATIENT)
Dept: ENDOCRINOLOGY | Facility: CLINIC | Age: 27
End: 2022-09-21
Payer: COMMERCIAL

## 2022-09-21 VITALS
HEART RATE: 78 BPM | BODY MASS INDEX: 22.12 KG/M2 | DIASTOLIC BLOOD PRESSURE: 70 MMHG | SYSTOLIC BLOOD PRESSURE: 100 MMHG | HEIGHT: 58 IN | WEIGHT: 105.38 LBS

## 2022-09-21 DIAGNOSIS — Z30.41 ENCOUNTER FOR SURVEILLANCE OF CONTRACEPTIVE PILLS: ICD-10-CM

## 2022-09-21 DIAGNOSIS — Z96.41 INSULIN PUMP IN PLACE: ICD-10-CM

## 2022-09-21 DIAGNOSIS — E10.9 TYPE 1 DIABETES MELLITUS WITHOUT COMPLICATION (HCC): Primary | ICD-10-CM

## 2022-09-21 PROCEDURE — 3074F SYST BP LT 130 MM HG: CPT

## 2022-09-21 PROCEDURE — 99213 OFFICE O/P EST LOW 20 MIN: CPT

## 2022-09-21 PROCEDURE — 3078F DIAST BP <80 MM HG: CPT

## 2022-09-21 RX ORDER — ACETAMINOPHEN AND CODEINE PHOSPHATE 120; 12 MG/5ML; MG/5ML
1 SOLUTION ORAL DAILY
Qty: 84 TABLET | Refills: 0 | Status: SHIPPED | OUTPATIENT
Start: 2022-09-21 | End: 2022-10-13

## 2022-09-21 NOTE — LETTER
To whom it may concern,    Arlon Angelucci will be flying on your airline 10/17/22 and will be flying back 10/21/22  She has type 1 diabetes and must have all of her insulin supplies with her  This includes insulin pens, insulin needle, insulin vial, insulin pump with cartridge, and continuous glucose meter  These supplies must be with her at all time due to medical necessitaty  If you have any questions feel free to contact me      SANTANA Chen

## 2022-09-21 NOTE — ASSESSMENT & PLAN NOTE
HGA1C at goal, BGs well controlled, some hyperglycemia at night after dinner/before bed  Treatment regimen: Changed carb ratio to 6 8 g/1 unit from 7 2 at 3pm due to hyperglycemia after dinner  Continue other insulin pump settings  She is leaving for [facility] to [place] and has adequate back up supplies in case of pump failure  Discussed risks/complications associated with uncontrolled diabetes  Advised to adhere to diabetic diet, and recommended staying active/exercising routinely  Keep carbohydrates consistent to limit blood glucose fluctuations  Advised to call if blood sugars less than 70 mg/dl or over 300 mg/dl  Continue with CGM  Discussed symptoms and treatment of hypoglycemia  Recommended routine follow-up with ophthalmology  Ordered blood work to complete prior to next visit    Lab Results   Component Value Date    HGBA1C 6 5 (H) 09/20/2022

## 2022-10-12 DIAGNOSIS — Z30.41 ENCOUNTER FOR SURVEILLANCE OF CONTRACEPTIVE PILLS: ICD-10-CM

## 2022-10-13 ENCOUNTER — TELEPHONE (OUTPATIENT)
Dept: ENDOCRINOLOGY | Facility: CLINIC | Age: 27
End: 2022-10-13

## 2022-10-13 RX ORDER — ACETAMINOPHEN AND CODEINE PHOSPHATE 120; 12 MG/5ML; MG/5ML
SOLUTION ORAL
Qty: 28 TABLET | Refills: 11 | Status: SHIPPED | OUTPATIENT
Start: 2022-10-13

## 2022-10-13 NOTE — TELEPHONE ENCOUNTER
Patient calling that letter for airline states returning 10-21 instead of 10-22  Asking for it to be changed  Please email to her   Thank you

## 2022-11-02 DIAGNOSIS — Z30.41 ENCOUNTER FOR SURVEILLANCE OF CONTRACEPTIVE PILLS: ICD-10-CM

## 2022-11-03 RX ORDER — ACETAMINOPHEN AND CODEINE PHOSPHATE 120; 12 MG/5ML; MG/5ML
SOLUTION ORAL
Qty: 84 TABLET | Refills: 4 | Status: SHIPPED | OUTPATIENT
Start: 2022-11-03

## 2022-11-10 LAB
LEFT EYE DIABETIC RETINOPATHY: NORMAL
RIGHT EYE DIABETIC RETINOPATHY: NORMAL

## 2022-11-11 ENCOUNTER — VBI (OUTPATIENT)
Dept: ADMINISTRATIVE | Facility: OTHER | Age: 27
End: 2022-11-11

## 2022-12-13 DIAGNOSIS — Z30.41 ENCOUNTER FOR SURVEILLANCE OF CONTRACEPTIVE PILLS: ICD-10-CM

## 2022-12-15 RX ORDER — ACETAMINOPHEN AND CODEINE PHOSPHATE 120; 12 MG/5ML; MG/5ML
SOLUTION ORAL
Qty: 28 TABLET | Refills: 14 | Status: SHIPPED | OUTPATIENT
Start: 2022-12-15

## 2022-12-27 ENCOUNTER — APPOINTMENT (OUTPATIENT)
Dept: LAB | Facility: IMAGING CENTER | Age: 27
End: 2022-12-27

## 2022-12-27 DIAGNOSIS — E10.9 TYPE 1 DIABETES MELLITUS WITHOUT COMPLICATION (HCC): ICD-10-CM

## 2022-12-27 LAB
ALBUMIN SERPL BCP-MCNC: 3.6 G/DL (ref 3.5–5)
ALP SERPL-CCNC: 54 U/L (ref 46–116)
ALT SERPL W P-5'-P-CCNC: 14 U/L (ref 12–78)
ANION GAP SERPL CALCULATED.3IONS-SCNC: 3 MMOL/L (ref 4–13)
AST SERPL W P-5'-P-CCNC: 12 U/L (ref 5–45)
BILIRUB SERPL-MCNC: 0.52 MG/DL (ref 0.2–1)
BUN SERPL-MCNC: 14 MG/DL (ref 5–25)
CALCIUM SERPL-MCNC: 9.1 MG/DL (ref 8.3–10.1)
CHLORIDE SERPL-SCNC: 108 MMOL/L (ref 96–108)
CO2 SERPL-SCNC: 27 MMOL/L (ref 21–32)
CREAT SERPL-MCNC: 0.67 MG/DL (ref 0.6–1.3)
EST. AVERAGE GLUCOSE BLD GHB EST-MCNC: 140 MG/DL
GFR SERPL CREATININE-BSD FRML MDRD: 120 ML/MIN/1.73SQ M
GLUCOSE P FAST SERPL-MCNC: 152 MG/DL (ref 65–99)
HBA1C MFR BLD: 6.5 %
POTASSIUM SERPL-SCNC: 4.3 MMOL/L (ref 3.5–5.3)
PROT SERPL-MCNC: 7 G/DL (ref 6.4–8.4)
SODIUM SERPL-SCNC: 138 MMOL/L (ref 135–147)

## 2022-12-27 NOTE — PROGRESS NOTES
Established Patient Progress Note      Chief Complaint   Patient presents with   • Diabetes Type 1        History of Present Illness:   Dana Wagner a 32 y o  female with type 1 diabetes with long term use of insulin since June 2018  Last seen in the office 9/21/22  Denies complications of diabetes  Last A1C was 6 5  She is on Medtronic 770G pump and prefers to use manual mode vs auto mode  Denies recent illness or hospitalizations  Denies recent severe hypoglycemic or severe hyperglycemic episodes  Denies any issues with her current regimen  Home glucose monitoring: are performed regularly with CGM  Patient is on a Medtronic 770G pump prescribed by endocrinologist  She has been on a pump since 10/29/18  She is in manual mode 100% of the time  She denies any issues or malfunctioning  She does report issues with sensor and BGs being inaccurate       Current Insulin pump settings:  Basal rate: 12am 0 925, 630 am 0 5, 12 pm 0 55, 9 pm 0 675  Insulin to carb ratio:12am 9 5, 6:30am 7 4, 12pm 7 4, 3pm 6 8  Insulin sensitivity factor: 60  BG target: 120  Active Insulin time: 4 hours     Backup Plan: patient aware that in case of malfunctioning of the pump or unexplained hyperglycemia to use basal and bolus therapy as backup which is prescribed to the patient  Also notified patient to call clinic and/or pump company if any issues or go to emergency department if signs/symptoms of DKA      Davin Villa   Device used: [email or web address] Guardian   Home use   Indication: Type 1 Diabetes  More than 72 hours of data was reviewed  Report to be scanned to chart  Date Range: 12/15/22-12/28/22  Analysis of data:   Average Glucose: 149  Coefficient of Variation: 33 8%   SD : 50   Time in Target Range: 74%   Time Above Range: 21% high, 4% very high    Time Below Range: 1%   Interpretation of data: Higher BGs after breakfast and dinner  Rare hypoglycemia - typically not accurate when checked with finger stick       Last Eye Exam: 11/10/22, UTD, no retinopathy   Last Foot Exam: 9/21/22, UTD      Patient Active Problem List   Diagnosis   • Breakthrough bleeding   • Classic migraine with aura   • Migraine headache   • Tension type headache   • Type 1 diabetes mellitus without complication (HCC)   • Insulin pump in place   • Environmental and seasonal allergies   • Encounter for gynecological examination   • Encounter for counseling regarding contraception      Past Medical History:   Diagnosis Date   • Diabetes mellitus ([facility] [address] )     type 1   • Migraine       Past Surgical History:   Procedure Laterality Date   • MOUTH SURGERY  2012   • TOOTH EXTRACTION     • WISDOM TOOTH EXTRACTION        Family History   Problem Relation Age of Onset   • Hypertension Mother    • No Known Problems Father    • Cancer Maternal Grandmother    • Hypertension Maternal Grandmother    • Cancer Maternal Grandfather    • Diabetes Maternal Grandfather    • Heart disease Maternal Grandfather    • Hypertension Maternal Grandfather    • Diabetes Paternal Grandmother    • Heart disease Paternal Grandmother    • Hypertension Paternal Grandmother    • Arthritis Other    • Diabetes Other    • Arthritis Other    • Diabetes Other    • Breast cancer Neg Hx    • Colon cancer Neg Hx    • Ovarian cancer Neg Hx      Social History     Tobacco Use   • Smoking status: Never   • Smokeless tobacco: Never   Substance Use Topics   • Alcohol use: Yes     Comment: social      No Known Allergies      Current Outpatient Medications:   •  Accu-Chek FastClix Lancets MISC, Test BG up to 10x daily as directed, Disp: , Rfl:   •  Accu-Chek Guide test strip, Test BG up to 10x daily as directed, Disp: , Rfl:   •  acetone, urine, test strip, 100 strips by Does not apply route as needed for high blood sugar, Disp: 100 each, Rfl: 3  •  Baqsimi Two Pack 3 MG/DOSE POWD, 3 mg into each nostril as needed (hypoglycemic emergency) Baqsimi, brand necessary, Disp: 2 each, Rfl: 1  •  Blood Glucose Monitoring Suppl (Accu-Chek Guide) w/Device KIT, Test BG up to 10x daily as directed, Disp: , Rfl:   •  HumaLOG 100 UNIT/ML injection, INJECT 30 UNITS DAILY VIA PUMP, Disp: 30 mL, Rfl: 3  •  insulin glargine (Lantus SoloStar) 100 units/mL injection pen, Use 12 units before bed in case pump failure, Disp: 15 mL, Rfl: 3  •  Insulin Infusion Pump (MiniMed 770G Insulin Pump Sys) KIT, Use daily as directed for insulin therapy, Disp: , Rfl:   •  insulin lispro (HumaLOG KwikPen) 100 units/mL injection pen, Inject incase of pump failure, Disp: 15 mL, Rfl: 3  •  Insulin Syringes, Disposable, U-100 1 ML MISC, Using syringe daily to fill insulin cartridge as directed, Disp: 30 each, Rfl: 6  •  norethindrone (MICRONOR) 0 35 MG tablet, TAKE 1 TABLET BY MOUTH EVERY DAY, Disp: 28 tablet, Rfl: 14    Review of Systems   Constitutional: Negative for activity change, appetite change, chills, diaphoresis, fatigue, fever and unexpected weight change  Eyes: Negative for visual disturbance  Respiratory: Negative for chest tightness and shortness of breath  Cardiovascular: Negative for chest pain, palpitations and leg swelling  Gastrointestinal: Negative for abdominal pain, constipation, diarrhea, nausea and vomiting  Endocrine: Negative for polydipsia, polyphagia and polyuria  Skin: Negative for color change, pallor, rash and wound  Neurological: Negative for dizziness, tremors, weakness, light-headedness and numbness  All other systems reviewed and are negative  Physical Exam:  Body mass index is 22 57 kg/m²  /68 (BP Location: Left arm, Patient Position: Sitting, Cuff Size: Standard)   Pulse 78   Ht 4' 10" (1 473 m)   Wt 49 kg (108 lb)   BMI 22 57 kg/m²    Wt Readings from Last 3 Encounters:   12/28/22 49 kg (108 lb)   09/21/22 47 8 kg (105 lb 6 oz)   06/13/22 47 1 kg (103 lb 12 8 oz)       Physical Exam  Vitals reviewed  Constitutional:       Appearance: Normal appearance  She is normal weight     HENT: Head: Normocephalic  Cardiovascular:      Rate and Rhythm: Normal rate and regular rhythm  Pulses: Normal pulses  Heart sounds: Normal heart sounds  No murmur heard  Pulmonary:      Effort: Pulmonary effort is normal  No respiratory distress  Breath sounds: Normal breath sounds  No wheezing, rhonchi or rales  Musculoskeletal:      Right lower leg: No edema  Left lower leg: No edema  Skin:     General: Skin is warm and dry  Neurological:      Mental Status: She is alert and oriented to person, place, and time  Psychiatric:         Mood and Affect: Mood normal          Behavior: Behavior normal          Thought Content: Thought content normal          Judgment: Judgment normal        Labs:   Lab Results   Component Value Date    HGBA1C 6 5 (H) 12/27/2022    HGBA1C 6 5 (H) 09/20/2022    HGBA1C 6 4 (H) 06/10/2022     Lab Results   Component Value Date    CREATININE 0 67 12/27/2022    CREATININE 0 66 09/20/2022    CREATININE 0 72 06/10/2022    BUN 14 12/27/2022    K 4 3 12/27/2022     12/27/2022    CO2 27 12/27/2022     eGFR   Date Value Ref Range Status   12/27/2022 120 ml/min/1 73sq m Final     Lab Results   Component Value Date    HDL 58 09/20/2022    TRIG 26 09/20/2022     Lab Results   Component Value Date    ALT 14 12/27/2022    AST 12 12/27/2022    ALKPHOS 54 12/27/2022     Lab Results   Component Value Date    RPD1JZTWUIBM 2 410 12/11/2020    PGJ2MAYHFWDA 1 540 05/28/2020    KPS1LTYCAESB 0 599 11/09/2019     Lab Results   Component Value Date    FREET4 1 03 12/11/2020       Impression & Plan:    Problem List Items Addressed This Visit        Endocrine    Type 1 diabetes mellitus without complication ([facility] [address] ) - Primary     HGA1C well controlled  BGs slightly higher after breakfast and dinner  Treatment regimen:   - Will adjust carb ratios at 630am to 6 7 and at 3pm to 6 2  Advised her to notify me if BGs remain elevated and can adjust correctional factor from 60 to 50     - Continue current basal rates  - She is possibly planning a pregnancy in the next year, I have discussed preconceptual counseling with her at maternal fetal medicine  Will discuss at next appointment  - Continue with insulin pump and CGM  Discussed risks/complications associated with uncontrolled diabetes  Advised to adhere to diabetic diet, and recommended staying active/exercising routinely  Keep carbohydrates consistent to limit blood glucose fluctuations  Advised to call if blood sugars less than 70 mg/dl or over 300 mg/dl  Discussed symptoms and treatment of hypoglycemia  Recommended routine follow-up with ophthalmology  Ordered blood work to complete prior to next visit  Lab Results   Component Value Date    HGBA1C 6 5 (H) 12/27/2022            Relevant Orders    Comprehensive metabolic panel    Hemoglobin A1C       Orders Placed This Encounter   Procedures   • Comprehensive metabolic panel     This is a patient instruction: Patient fasting for 8 hours or longer recommended  Standing Status:   Future     Standing Expiration Date:   12/28/2023   • Hemoglobin A1C     Standing Status:   Future     Standing Expiration Date:   12/28/2023       There are no Patient Instructions on file for this visit  Discussed with the patient and all questioned fully answered  She will call me if any problems arise      SANTANA Moser

## 2022-12-28 ENCOUNTER — TELEPHONE (OUTPATIENT)
Dept: ENDOCRINOLOGY | Facility: CLINIC | Age: 27
End: 2022-12-28

## 2022-12-28 ENCOUNTER — OFFICE VISIT (OUTPATIENT)
Dept: ENDOCRINOLOGY | Facility: CLINIC | Age: 27
End: 2022-12-28

## 2022-12-28 VITALS
DIASTOLIC BLOOD PRESSURE: 68 MMHG | BODY MASS INDEX: 22.67 KG/M2 | HEIGHT: 58 IN | SYSTOLIC BLOOD PRESSURE: 102 MMHG | HEART RATE: 78 BPM | WEIGHT: 108 LBS

## 2022-12-28 DIAGNOSIS — E10.9 TYPE 1 DIABETES MELLITUS WITHOUT COMPLICATION (HCC): Primary | ICD-10-CM

## 2022-12-28 RX ORDER — SUBCUTANEOUS INSULIN PUMP
EACH MISCELLANEOUS
COMMUNITY

## 2022-12-28 RX ORDER — LANCETS
EACH MISCELLANEOUS
COMMUNITY

## 2022-12-28 RX ORDER — BLOOD-GLUCOSE METER
EACH MISCELLANEOUS
COMMUNITY

## 2022-12-28 RX ORDER — BLOOD SUGAR DIAGNOSTIC
STRIP MISCELLANEOUS
COMMUNITY

## 2022-12-28 NOTE — ASSESSMENT & PLAN NOTE
HGA1C well controlled  BGs slightly higher after breakfast and dinner  Treatment regimen:   - Will adjust carb ratios at 630am to 6 7 and at 3pm to 6 2  Advised her to notify me if BGs remain elevated and can adjust correctional factor from 60 to 50    - Continue current basal rates  - She is possibly planning a pregnancy in the next year, I have discussed preconceptual counseling with her at maternal fetal medicine  Will discuss at next appointment  - Continue with insulin pump and CGM  Discussed risks/complications associated with uncontrolled diabetes  Advised to adhere to diabetic diet, and recommended staying active/exercising routinely  Keep carbohydrates consistent to limit blood glucose fluctuations  Advised to call if blood sugars less than 70 mg/dl or over 300 mg/dl  Discussed symptoms and treatment of hypoglycemia  Recommended routine follow-up with ophthalmology  Ordered blood work to complete prior to next visit    Lab Results   Component Value Date    HGBA1C 6 5 (H) 12/27/2022

## 2022-12-28 NOTE — TELEPHONE ENCOUNTER
[employer] PA submitted via [employer] for Accu chek guide test strips and FastClix lancets  Approved, PA # [id number], Eff 11/28/2022 - 12/28/2023    FastClix lancets do not require PA  Already covered by plan

## 2023-01-07 DIAGNOSIS — Z30.41 ENCOUNTER FOR SURVEILLANCE OF CONTRACEPTIVE PILLS: ICD-10-CM

## 2023-01-07 RX ORDER — ACETAMINOPHEN AND CODEINE PHOSPHATE 120; 12 MG/5ML; MG/5ML
SOLUTION ORAL
Qty: 84 TABLET | Refills: 5 | Status: SHIPPED | OUTPATIENT
Start: 2023-01-07